# Patient Record
Sex: FEMALE | Race: WHITE | NOT HISPANIC OR LATINO | Employment: OTHER | URBAN - METROPOLITAN AREA
[De-identification: names, ages, dates, MRNs, and addresses within clinical notes are randomized per-mention and may not be internally consistent; named-entity substitution may affect disease eponyms.]

---

## 2021-08-26 ENCOUNTER — HOSPITAL ENCOUNTER (EMERGENCY)
Facility: MEDICAL CENTER | Age: 66
End: 2021-08-26
Payer: MEDICARE

## 2021-08-26 VITALS
WEIGHT: 122.8 LBS | BODY MASS INDEX: 24.76 KG/M2 | RESPIRATION RATE: 14 BRPM | SYSTOLIC BLOOD PRESSURE: 122 MMHG | HEART RATE: 79 BPM | HEIGHT: 59 IN | TEMPERATURE: 102.6 F | OXYGEN SATURATION: 95 % | DIASTOLIC BLOOD PRESSURE: 66 MMHG

## 2021-08-26 LAB
APPEARANCE UR: CLEAR
BACTERIA #/AREA URNS HPF: NEGATIVE /HPF
BILIRUB UR QL STRIP.AUTO: NEGATIVE
COLOR UR: YELLOW
EPI CELLS #/AREA URNS HPF: NEGATIVE /HPF
GLUCOSE UR STRIP.AUTO-MCNC: NEGATIVE MG/DL
HYALINE CASTS #/AREA URNS LPF: ABNORMAL /LPF
KETONES UR STRIP.AUTO-MCNC: ABNORMAL MG/DL
LEUKOCYTE ESTERASE UR QL STRIP.AUTO: NEGATIVE
MICRO URNS: ABNORMAL
NITRITE UR QL STRIP.AUTO: NEGATIVE
PH UR STRIP.AUTO: 5.5 [PH] (ref 5–8)
PROT UR QL STRIP: NEGATIVE MG/DL
RBC # URNS HPF: ABNORMAL /HPF
RBC UR QL AUTO: ABNORMAL
SP GR UR STRIP.AUTO: 1.02
UROBILINOGEN UR STRIP.AUTO-MCNC: 0.2 MG/DL
WBC #/AREA URNS HPF: ABNORMAL /HPF

## 2021-08-26 PROCEDURE — U0005 INFEC AGEN DETEC AMPLI PROBE: HCPCS

## 2021-08-26 PROCEDURE — 700102 HCHG RX REV CODE 250 W/ 637 OVERRIDE(OP): Performed by: EMERGENCY MEDICINE

## 2021-08-26 PROCEDURE — 302449 STATCHG TRIAGE ONLY (STATISTIC)

## 2021-08-26 PROCEDURE — A9270 NON-COVERED ITEM OR SERVICE: HCPCS | Performed by: EMERGENCY MEDICINE

## 2021-08-26 PROCEDURE — U0003 INFECTIOUS AGENT DETECTION BY NUCLEIC ACID (DNA OR RNA); SEVERE ACUTE RESPIRATORY SYNDROME CORONAVIRUS 2 (SARS-COV-2) (CORONAVIRUS DISEASE [COVID-19]), AMPLIFIED PROBE TECHNIQUE, MAKING USE OF HIGH THROUGHPUT TECHNOLOGIES AS DESCRIBED BY CMS-2020-01-R: HCPCS

## 2021-08-26 PROCEDURE — 81001 URINALYSIS AUTO W/SCOPE: CPT

## 2021-08-26 RX ORDER — ACETAMINOPHEN 325 MG/1
650 TABLET ORAL ONCE
Status: COMPLETED | OUTPATIENT
Start: 2021-08-26 | End: 2021-08-26

## 2021-08-26 RX ADMIN — ACETAMINOPHEN 650 MG: 325 TABLET, FILM COATED ORAL at 21:45

## 2021-08-27 LAB
SARS-COV-2 RNA RESP QL NAA+PROBE: DETECTED
SPECIMEN SOURCE: ABNORMAL

## 2021-08-27 NOTE — ED TRIAGE NOTES
"Patient vital signs rechecked and documented per [facility]. Patient denies any new needs at this time.  Patient updated on wait times, thanked for patience. Pt informed to alert triage tech or triage RN with any needs and/or changes in condition; patient verbalized understanding.   Patient stated \"I feel the same\"  "

## 2021-08-27 NOTE — ED NOTES
"Pt's family reentered ER waiting room, upset pt has not been seen. Family states, \"We're leaving.\" Refuse to sign AMA form. Will dismiss.   "

## 2021-08-27 NOTE — ED TRIAGE NOTES
"Chief Complaint   Patient presents with   • Coronavirus Screening     Pt started feeling bad today, reports sudden onset fever. Pt did not receive a covid vaccine. Pt states that people around her have tested positive for covid.    • Painful Urination     /70   Pulse 84   Temp (!) 38.9 °C (102 °F) (Oral)   Resp 16   Ht 1.499 m (4' 11\")   Wt 55.7 kg (122 lb 12.7 oz)   SpO2 94% Comment: RA  BMI 24.80 kg/m²     Pt is ambulatory in and out of triage with a steady gait. Pt has dementia at baseline, pt is accompanied by her niece, who she lives with. Appropriate PPE worn at times throughout encounter, had to ask pt and family multiple times to pull masks over noses. Pt placed back in the lobby and educated about triage process.    Pt placed in senior lounge, swabbed for covid, urine cup given in triage.   "

## 2021-10-25 ENCOUNTER — APPOINTMENT (OUTPATIENT)
Dept: RADIOLOGY | Facility: MEDICAL CENTER | Age: 66
DRG: 853 | End: 2021-10-25
Attending: EMERGENCY MEDICINE
Payer: MEDICARE

## 2021-10-25 ENCOUNTER — OFFICE VISIT (OUTPATIENT)
Dept: URGENT CARE | Facility: CLINIC | Age: 66
End: 2021-10-25

## 2021-10-25 ENCOUNTER — OFFICE VISIT (OUTPATIENT)
Dept: URGENT CARE | Facility: CLINIC | Age: 66
End: 2021-10-25
Payer: MEDICARE

## 2021-10-25 ENCOUNTER — HOSPITAL ENCOUNTER (INPATIENT)
Facility: MEDICAL CENTER | Age: 66
LOS: 7 days | DRG: 853 | End: 2021-11-01
Attending: EMERGENCY MEDICINE | Admitting: STUDENT IN AN ORGANIZED HEALTH CARE EDUCATION/TRAINING PROGRAM
Payer: MEDICARE

## 2021-10-25 VITALS
DIASTOLIC BLOOD PRESSURE: 60 MMHG | HEIGHT: 59 IN | TEMPERATURE: 100.7 F | WEIGHT: 126 LBS | BODY MASS INDEX: 25.4 KG/M2 | SYSTOLIC BLOOD PRESSURE: 108 MMHG | HEART RATE: 85 BPM | OXYGEN SATURATION: 95 % | RESPIRATION RATE: 16 BRPM

## 2021-10-25 VITALS
OXYGEN SATURATION: 95 % | BODY MASS INDEX: 25.4 KG/M2 | HEART RATE: 85 BPM | DIASTOLIC BLOOD PRESSURE: 60 MMHG | TEMPERATURE: 100.7 F | HEIGHT: 59 IN | RESPIRATION RATE: 16 BRPM | WEIGHT: 126 LBS | SYSTOLIC BLOOD PRESSURE: 108 MMHG

## 2021-10-25 DIAGNOSIS — R41.82 ALTERED MENTAL STATUS, UNSPECIFIED ALTERED MENTAL STATUS TYPE: ICD-10-CM

## 2021-10-25 DIAGNOSIS — R50.9 FEVER AND CHILLS: ICD-10-CM

## 2021-10-25 DIAGNOSIS — R19.7 DIARRHEA, UNSPECIFIED TYPE: ICD-10-CM

## 2021-10-25 DIAGNOSIS — R41.0 DISORIENTATION, UNSPECIFIED: ICD-10-CM

## 2021-10-25 DIAGNOSIS — R41.0 DISORIENTED: ICD-10-CM

## 2021-10-25 DIAGNOSIS — A41.9 SEPSIS, DUE TO UNSPECIFIED ORGANISM, UNSPECIFIED WHETHER ACUTE ORGAN DYSFUNCTION PRESENT (HCC): ICD-10-CM

## 2021-10-25 DIAGNOSIS — R50.9 FEVER, UNSPECIFIED FEVER CAUSE: ICD-10-CM

## 2021-10-25 LAB
ANION GAP SERPL CALC-SCNC: 14 MMOL/L (ref 7–16)
APPEARANCE UR: ABNORMAL
APPEARANCE UR: CLEAR
BACTERIA #/AREA URNS HPF: NEGATIVE /HPF
BASOPHILS # BLD AUTO: 0.3 % (ref 0–1.8)
BASOPHILS # BLD: 0.12 K/UL (ref 0–0.12)
BILIRUB UR QL STRIP.AUTO: ABNORMAL
BILIRUB UR STRIP-MCNC: NORMAL MG/DL
BUN SERPL-MCNC: 15 MG/DL (ref 8–22)
CALCIUM SERPL-MCNC: 9.2 MG/DL (ref 8.5–10.5)
CHLORIDE SERPL-SCNC: 99 MMOL/L (ref 96–112)
CO2 SERPL-SCNC: 24 MMOL/L (ref 20–33)
COLOR UR AUTO: NORMAL
COLOR UR: ABNORMAL
CREAT SERPL-MCNC: 1.18 MG/DL (ref 0.5–1.4)
EOSINOPHIL # BLD AUTO: 0.04 K/UL (ref 0–0.51)
EOSINOPHIL NFR BLD: 0.1 % (ref 0–6.9)
EPI CELLS #/AREA URNS HPF: ABNORMAL /HPF
ERYTHROCYTE [DISTWIDTH] IN BLOOD BY AUTOMATED COUNT: 45.9 FL (ref 35.9–50)
EXTERNAL QUALITY CONTROL: NORMAL
GLUCOSE SERPL-MCNC: 152 MG/DL (ref 65–99)
GLUCOSE UR STRIP.AUTO-MCNC: NEGATIVE MG/DL
GLUCOSE UR STRIP.AUTO-MCNC: NEGATIVE MG/DL
GRAN CASTS #/AREA URNS LPF: ABNORMAL /LPF
HCT VFR BLD AUTO: 40.9 % (ref 37–47)
HGB BLD-MCNC: 13.8 G/DL (ref 12–16)
HYALINE CASTS #/AREA URNS LPF: ABNORMAL /LPF
IMM GRANULOCYTES # BLD AUTO: 0.63 K/UL (ref 0–0.11)
IMM GRANULOCYTES NFR BLD AUTO: 1.6 % (ref 0–0.9)
KETONES UR STRIP.AUTO-MCNC: ABNORMAL MG/DL
KETONES UR STRIP.AUTO-MCNC: NORMAL MG/DL
LACTATE BLD-SCNC: 3.9 MMOL/L (ref 0.5–2)
LEUKOCYTE ESTERASE UR QL STRIP.AUTO: NEGATIVE
LEUKOCYTE ESTERASE UR QL STRIP.AUTO: NORMAL
LYMPHOCYTES # BLD AUTO: 0.65 K/UL (ref 1–4.8)
LYMPHOCYTES NFR BLD: 1.7 % (ref 22–41)
MCH RBC QN AUTO: 30.7 PG (ref 27–33)
MCHC RBC AUTO-ENTMCNC: 33.7 G/DL (ref 33.6–35)
MCV RBC AUTO: 90.9 FL (ref 81.4–97.8)
MICRO URNS: ABNORMAL
MONOCYTES # BLD AUTO: 0.97 K/UL (ref 0–0.85)
MONOCYTES NFR BLD AUTO: 2.5 % (ref 0–13.4)
MUCOUS THREADS #/AREA URNS HPF: ABNORMAL /HPF
NEUTROPHILS # BLD AUTO: 35.81 K/UL (ref 2–7.15)
NEUTROPHILS NFR BLD: 93.8 % (ref 44–72)
NITRITE UR QL STRIP.AUTO: NEGATIVE
NITRITE UR QL STRIP.AUTO: NEGATIVE
NRBC # BLD AUTO: 0 K/UL
NRBC BLD-RTO: 0 /100 WBC
PH UR STRIP.AUTO: 5 [PH] (ref 5–8)
PH UR STRIP.AUTO: 5.5 [PH] (ref 5–8)
PLATELET # BLD AUTO: 299 K/UL (ref 164–446)
PMV BLD AUTO: 9.2 FL (ref 9–12.9)
POTASSIUM SERPL-SCNC: 3.5 MMOL/L (ref 3.6–5.5)
PROT UR QL STRIP: 100 MG/DL
PROT UR QL STRIP: NORMAL MG/DL
RBC # BLD AUTO: 4.5 M/UL (ref 4.2–5.4)
RBC # URNS HPF: ABNORMAL /HPF
RBC UR QL AUTO: ABNORMAL
RBC UR QL AUTO: NORMAL
SARS-COV+SARS-COV-2 AG RESP QL IA.RAPID: NEGATIVE
SODIUM SERPL-SCNC: 137 MMOL/L (ref 135–145)
SP GR UR STRIP.AUTO: 1.03
SP GR UR STRIP.AUTO: >=1.03
UROBILINOGEN UR STRIP-MCNC: NORMAL MG/DL
UROBILINOGEN UR STRIP.AUTO-MCNC: 0.2 MG/DL
WBC # BLD AUTO: 38.2 K/UL (ref 4.8–10.8)
WBC #/AREA URNS HPF: ABNORMAL /HPF

## 2021-10-25 PROCEDURE — 85025 COMPLETE CBC W/AUTO DIFF WBC: CPT

## 2021-10-25 PROCEDURE — 99285 EMERGENCY DEPT VISIT HI MDM: CPT

## 2021-10-25 PROCEDURE — 87426 SARSCOV CORONAVIRUS AG IA: CPT | Performed by: NURSE PRACTITIONER

## 2021-10-25 PROCEDURE — 71045 X-RAY EXAM CHEST 1 VIEW: CPT

## 2021-10-25 PROCEDURE — 770020 HCHG ROOM/CARE - TELE (206)

## 2021-10-25 PROCEDURE — 87040 BLOOD CULTURE FOR BACTERIA: CPT | Mod: 91

## 2021-10-25 PROCEDURE — 84145 PROCALCITONIN (PCT): CPT

## 2021-10-25 PROCEDURE — 87181 SC STD AGAR DILUTION PER AGT: CPT

## 2021-10-25 PROCEDURE — 700105 HCHG RX REV CODE 258: Performed by: EMERGENCY MEDICINE

## 2021-10-25 PROCEDURE — 87077 CULTURE AEROBIC IDENTIFY: CPT

## 2021-10-25 PROCEDURE — 85610 PROTHROMBIN TIME: CPT

## 2021-10-25 PROCEDURE — 83735 ASSAY OF MAGNESIUM: CPT

## 2021-10-25 PROCEDURE — 36415 COLL VENOUS BLD VENIPUNCTURE: CPT

## 2021-10-25 PROCEDURE — 700111 HCHG RX REV CODE 636 W/ 250 OVERRIDE (IP): Performed by: EMERGENCY MEDICINE

## 2021-10-25 PROCEDURE — 83605 ASSAY OF LACTIC ACID: CPT

## 2021-10-25 PROCEDURE — 99223 1ST HOSP IP/OBS HIGH 75: CPT | Performed by: STUDENT IN AN ORGANIZED HEALTH CARE EDUCATION/TRAINING PROGRAM

## 2021-10-25 PROCEDURE — 99204 OFFICE O/P NEW MOD 45 MIN: CPT | Mod: CS | Performed by: NURSE PRACTITIONER

## 2021-10-25 PROCEDURE — 96374 THER/PROPH/DIAG INJ IV PUSH: CPT

## 2021-10-25 PROCEDURE — 84443 ASSAY THYROID STIM HORMONE: CPT

## 2021-10-25 PROCEDURE — 80048 BASIC METABOLIC PNL TOTAL CA: CPT

## 2021-10-25 PROCEDURE — 81001 URINALYSIS AUTO W/SCOPE: CPT

## 2021-10-25 PROCEDURE — 81002 URINALYSIS NONAUTO W/O SCOPE: CPT | Performed by: NURSE PRACTITIONER

## 2021-10-25 PROCEDURE — 70450 CT HEAD/BRAIN W/O DYE: CPT

## 2021-10-25 RX ORDER — GUAIFENESIN/DEXTROMETHORPHAN 100-10MG/5
10 SYRUP ORAL EVERY 6 HOURS PRN
Status: DISCONTINUED | OUTPATIENT
Start: 2021-10-25 | End: 2021-11-01 | Stop reason: HOSPADM

## 2021-10-25 RX ORDER — CEFTRIAXONE 2 G/1
2 INJECTION, POWDER, FOR SOLUTION INTRAMUSCULAR; INTRAVENOUS ONCE
Status: COMPLETED | OUTPATIENT
Start: 2021-10-25 | End: 2021-10-25

## 2021-10-25 RX ORDER — BISACODYL 10 MG
10 SUPPOSITORY, RECTAL RECTAL
Status: DISCONTINUED | OUTPATIENT
Start: 2021-10-25 | End: 2021-10-27

## 2021-10-25 RX ORDER — ACETAMINOPHEN 325 MG/1
650 TABLET ORAL EVERY 6 HOURS PRN
Status: DISCONTINUED | OUTPATIENT
Start: 2021-10-25 | End: 2021-11-01 | Stop reason: HOSPADM

## 2021-10-25 RX ORDER — ONDANSETRON 2 MG/ML
4 INJECTION INTRAMUSCULAR; INTRAVENOUS EVERY 4 HOURS PRN
Status: DISCONTINUED | OUTPATIENT
Start: 2021-10-25 | End: 2021-11-01 | Stop reason: HOSPADM

## 2021-10-25 RX ORDER — ENALAPRILAT 1.25 MG/ML
1.25 INJECTION INTRAVENOUS EVERY 6 HOURS PRN
Status: DISCONTINUED | OUTPATIENT
Start: 2021-10-25 | End: 2021-11-01 | Stop reason: HOSPADM

## 2021-10-25 RX ORDER — SODIUM CHLORIDE 9 MG/ML
1000 INJECTION, SOLUTION INTRAVENOUS ONCE
Status: COMPLETED | OUTPATIENT
Start: 2021-10-25 | End: 2021-10-26

## 2021-10-25 RX ORDER — LABETALOL HYDROCHLORIDE 5 MG/ML
10 INJECTION, SOLUTION INTRAVENOUS EVERY 4 HOURS PRN
Status: DISCONTINUED | OUTPATIENT
Start: 2021-10-25 | End: 2021-11-01 | Stop reason: HOSPADM

## 2021-10-25 RX ORDER — SODIUM CHLORIDE, SODIUM LACTATE, POTASSIUM CHLORIDE, CALCIUM CHLORIDE 600; 310; 30; 20 MG/100ML; MG/100ML; MG/100ML; MG/100ML
INJECTION, SOLUTION INTRAVENOUS CONTINUOUS
Status: DISCONTINUED | OUTPATIENT
Start: 2021-10-26 | End: 2021-11-01

## 2021-10-25 RX ORDER — POLYETHYLENE GLYCOL 3350 17 G/17G
1 POWDER, FOR SOLUTION ORAL
Status: DISCONTINUED | OUTPATIENT
Start: 2021-10-25 | End: 2021-10-27

## 2021-10-25 RX ORDER — ONDANSETRON 4 MG/1
4 TABLET, ORALLY DISINTEGRATING ORAL EVERY 4 HOURS PRN
Status: DISCONTINUED | OUTPATIENT
Start: 2021-10-25 | End: 2021-11-01 | Stop reason: HOSPADM

## 2021-10-25 RX ORDER — AMOXICILLIN 250 MG
2 CAPSULE ORAL 2 TIMES DAILY
Status: DISCONTINUED | OUTPATIENT
Start: 2021-10-26 | End: 2021-10-27

## 2021-10-25 RX ADMIN — SODIUM CHLORIDE 1000 ML: 9 INJECTION, SOLUTION INTRAVENOUS at 23:38

## 2021-10-25 RX ADMIN — CEFTRIAXONE SODIUM 2 G: 2 INJECTION, POWDER, FOR SOLUTION INTRAMUSCULAR; INTRAVENOUS at 23:38

## 2021-10-25 ASSESSMENT — ENCOUNTER SYMPTOMS
FEVER: 1
NAUSEA: 1
DIARRHEA: 1
VOMITING: 1
SENSORY CHANGE: 1
CHILLS: 1

## 2021-10-26 PROBLEM — R73.9 HYPERGLYCEMIA: Status: ACTIVE | Noted: 2021-10-26

## 2021-10-26 PROBLEM — A41.9 SEPSIS (HCC): Status: ACTIVE | Noted: 2021-10-26

## 2021-10-26 PROBLEM — R11.2 N&V (NAUSEA AND VOMITING): Status: ACTIVE | Noted: 2021-10-26

## 2021-10-26 PROBLEM — R79.89 ELEVATED LACTIC ACID LEVEL: Status: ACTIVE | Noted: 2021-10-26

## 2021-10-26 LAB
ALBUMIN SERPL BCP-MCNC: 3.6 G/DL (ref 3.2–4.9)
ALBUMIN/GLOB SERPL: 1.4 G/DL
ALP SERPL-CCNC: 93 U/L (ref 30–99)
ALT SERPL-CCNC: 28 U/L (ref 2–50)
ANION GAP SERPL CALC-SCNC: 12 MMOL/L (ref 7–16)
AST SERPL-CCNC: 32 U/L (ref 12–45)
BASOPHILS # BLD AUTO: 0.3 % (ref 0–1.8)
BASOPHILS # BLD: 0.1 K/UL (ref 0–0.12)
BILIRUB SERPL-MCNC: 0.5 MG/DL (ref 0.1–1.5)
BUN SERPL-MCNC: 16 MG/DL (ref 8–22)
CALCIUM SERPL-MCNC: 8.4 MG/DL (ref 8.5–10.5)
CHLORIDE SERPL-SCNC: 101 MMOL/L (ref 96–112)
CHOLEST SERPL-MCNC: 141 MG/DL (ref 100–199)
CO2 SERPL-SCNC: 23 MMOL/L (ref 20–33)
CREAT SERPL-MCNC: 1.01 MG/DL (ref 0.5–1.4)
CRP SERPL HS-MCNC: 15.44 MG/DL (ref 0–0.75)
EKG IMPRESSION: NORMAL
EOSINOPHIL # BLD AUTO: 0 K/UL (ref 0–0.51)
EOSINOPHIL NFR BLD: 0 % (ref 0–6.9)
ERYTHROCYTE [DISTWIDTH] IN BLOOD BY AUTOMATED COUNT: 47.1 FL (ref 35.9–50)
ERYTHROCYTE [SEDIMENTATION RATE] IN BLOOD BY WESTERGREN METHOD: 40 MM/HOUR (ref 0–25)
EST. AVERAGE GLUCOSE BLD GHB EST-MCNC: 105 MG/DL
GLOBULIN SER CALC-MCNC: 2.6 G/DL (ref 1.9–3.5)
GLUCOSE SERPL-MCNC: 121 MG/DL (ref 65–99)
HBA1C MFR BLD: 5.3 % (ref 4–5.6)
HCT VFR BLD AUTO: 36.9 % (ref 37–47)
HDLC SERPL-MCNC: 43 MG/DL
HGB BLD-MCNC: 12.5 G/DL (ref 12–16)
IMM GRANULOCYTES # BLD AUTO: 0.5 K/UL (ref 0–0.11)
IMM GRANULOCYTES NFR BLD AUTO: 1.5 % (ref 0–0.9)
INR PPP: 1.17 (ref 0.87–1.13)
LACTATE BLD-SCNC: 1.6 MMOL/L (ref 0.5–2)
LACTATE BLD-SCNC: 1.9 MMOL/L (ref 0.5–2)
LACTATE BLD-SCNC: 2.7 MMOL/L (ref 0.5–2)
LDLC SERPL CALC-MCNC: 84 MG/DL
LYMPHOCYTES # BLD AUTO: 0.8 K/UL (ref 1–4.8)
LYMPHOCYTES NFR BLD: 2.4 % (ref 22–41)
MAGNESIUM SERPL-MCNC: 1.6 MG/DL (ref 1.5–2.5)
MCH RBC QN AUTO: 31.1 PG (ref 27–33)
MCHC RBC AUTO-ENTMCNC: 33.9 G/DL (ref 33.6–35)
MCV RBC AUTO: 91.8 FL (ref 81.4–97.8)
MONOCYTES # BLD AUTO: 0.88 K/UL (ref 0–0.85)
MONOCYTES NFR BLD AUTO: 2.6 % (ref 0–13.4)
NEUTROPHILS # BLD AUTO: 31.76 K/UL (ref 2–7.15)
NEUTROPHILS NFR BLD: 93.2 % (ref 44–72)
NRBC # BLD AUTO: 0 K/UL
NRBC BLD-RTO: 0 /100 WBC
NT-PROBNP SERPL IA-MCNC: 1617 PG/ML (ref 0–125)
PLATELET # BLD AUTO: 276 K/UL (ref 164–446)
PMV BLD AUTO: 9.2 FL (ref 9–12.9)
POTASSIUM SERPL-SCNC: 3.8 MMOL/L (ref 3.6–5.5)
PROCALCITONIN SERPL-MCNC: 29.21 NG/ML
PROCALCITONIN SERPL-MCNC: 30.73 NG/ML
PROT SERPL-MCNC: 6.2 G/DL (ref 6–8.2)
PROTHROMBIN TIME: 14.6 SEC (ref 12–14.6)
RBC # BLD AUTO: 4.02 M/UL (ref 4.2–5.4)
SODIUM SERPL-SCNC: 136 MMOL/L (ref 135–145)
TRIGL SERPL-MCNC: 71 MG/DL (ref 0–149)
TSH SERPL DL<=0.005 MIU/L-ACNC: 0.82 UIU/ML (ref 0.38–5.33)
WBC # BLD AUTO: 34 K/UL (ref 4.8–10.8)

## 2021-10-26 PROCEDURE — 99233 SBSQ HOSP IP/OBS HIGH 50: CPT | Mod: GC | Performed by: INTERNAL MEDICINE

## 2021-10-26 PROCEDURE — 85652 RBC SED RATE AUTOMATED: CPT

## 2021-10-26 PROCEDURE — 93005 ELECTROCARDIOGRAM TRACING: CPT

## 2021-10-26 PROCEDURE — 85025 COMPLETE CBC W/AUTO DIFF WBC: CPT

## 2021-10-26 PROCEDURE — 83605 ASSAY OF LACTIC ACID: CPT

## 2021-10-26 PROCEDURE — 80053 COMPREHEN METABOLIC PANEL: CPT

## 2021-10-26 PROCEDURE — 700105 HCHG RX REV CODE 258: Performed by: STUDENT IN AN ORGANIZED HEALTH CARE EDUCATION/TRAINING PROGRAM

## 2021-10-26 PROCEDURE — 36415 COLL VENOUS BLD VENIPUNCTURE: CPT

## 2021-10-26 PROCEDURE — 93010 ELECTROCARDIOGRAM REPORT: CPT | Performed by: INTERNAL MEDICINE

## 2021-10-26 PROCEDURE — 84145 PROCALCITONIN (PCT): CPT

## 2021-10-26 PROCEDURE — 700102 HCHG RX REV CODE 250 W/ 637 OVERRIDE(OP): Performed by: STUDENT IN AN ORGANIZED HEALTH CARE EDUCATION/TRAINING PROGRAM

## 2021-10-26 PROCEDURE — 700111 HCHG RX REV CODE 636 W/ 250 OVERRIDE (IP)

## 2021-10-26 PROCEDURE — A9270 NON-COVERED ITEM OR SERVICE: HCPCS | Performed by: STUDENT IN AN ORGANIZED HEALTH CARE EDUCATION/TRAINING PROGRAM

## 2021-10-26 PROCEDURE — 80061 LIPID PANEL: CPT

## 2021-10-26 PROCEDURE — 700111 HCHG RX REV CODE 636 W/ 250 OVERRIDE (IP): Performed by: STUDENT IN AN ORGANIZED HEALTH CARE EDUCATION/TRAINING PROGRAM

## 2021-10-26 PROCEDURE — 770020 HCHG ROOM/CARE - TELE (206)

## 2021-10-26 PROCEDURE — 83880 ASSAY OF NATRIURETIC PEPTIDE: CPT

## 2021-10-26 PROCEDURE — 83036 HEMOGLOBIN GLYCOSYLATED A1C: CPT

## 2021-10-26 PROCEDURE — 96372 THER/PROPH/DIAG INJ SC/IM: CPT

## 2021-10-26 PROCEDURE — 700105 HCHG RX REV CODE 258

## 2021-10-26 PROCEDURE — 86140 C-REACTIVE PROTEIN: CPT

## 2021-10-26 RX ORDER — METRONIDAZOLE 500 MG/1
500 TABLET ORAL EVERY 8 HOURS
Status: DISCONTINUED | OUTPATIENT
Start: 2021-10-26 | End: 2021-10-26

## 2021-10-26 RX ADMIN — PIPERACILLIN AND TAZOBACTAM 3.38 G: 3; .375 INJECTION, POWDER, LYOPHILIZED, FOR SOLUTION INTRAVENOUS; PARENTERAL at 23:17

## 2021-10-26 RX ADMIN — ACETAMINOPHEN 650 MG: 325 TABLET, FILM COATED ORAL at 10:44

## 2021-10-26 RX ADMIN — ACETAMINOPHEN 650 MG: 325 TABLET, FILM COATED ORAL at 23:27

## 2021-10-26 RX ADMIN — CEFTRIAXONE SODIUM 2 G: 2 INJECTION, POWDER, FOR SOLUTION INTRAMUSCULAR; INTRAVENOUS at 17:27

## 2021-10-26 RX ADMIN — SODIUM CHLORIDE, POTASSIUM CHLORIDE, SODIUM LACTATE AND CALCIUM CHLORIDE: 600; 310; 30; 20 INJECTION, SOLUTION INTRAVENOUS at 20:57

## 2021-10-26 RX ADMIN — SODIUM CHLORIDE, POTASSIUM CHLORIDE, SODIUM LACTATE AND CALCIUM CHLORIDE: 600; 310; 30; 20 INJECTION, SOLUTION INTRAVENOUS at 00:14

## 2021-10-26 RX ADMIN — PIPERACILLIN AND TAZOBACTAM 4.5 G: 4; .5 INJECTION, POWDER, LYOPHILIZED, FOR SOLUTION INTRAVENOUS; PARENTERAL at 20:24

## 2021-10-26 RX ADMIN — ENOXAPARIN SODIUM 40 MG: 40 INJECTION SUBCUTANEOUS at 06:50

## 2021-10-26 RX ADMIN — ACETAMINOPHEN 650 MG: 325 TABLET, FILM COATED ORAL at 17:27

## 2021-10-26 ASSESSMENT — ENCOUNTER SYMPTOMS
DOUBLE VISION: 0
NECK PAIN: 0
BLURRED VISION: 0
MYALGIAS: 0
DEPRESSION: 0
FEVER: 0
DIZZINESS: 0
NAUSEA: 0
CHILLS: 0
COUGH: 0
HEMOPTYSIS: 0
PALPITATIONS: 0
HEADACHES: 0
HEARTBURN: 0

## 2021-10-26 ASSESSMENT — FIBROSIS 4 INDEX
FIB4 SCORE: 1.49
FIB4 SCORE: 1.49

## 2021-10-26 ASSESSMENT — PAIN DESCRIPTION - PAIN TYPE: TYPE: ACUTE PAIN

## 2021-10-26 NOTE — DISCHARGE PLANNING
SW has left message with phone number [phone]  No answer. Message states phone number belongs to [employer] and that voice mail box is full and a message can not be left.     JEREMY attempted 2 times calling that number- no answer.

## 2021-10-26 NOTE — ED NOTES
"PD at bedside  PD reports that Pt told them that \"family was going to be flying away and leaving me.\" PD going to speak to  to discuss situation. No current address or confirmed correct phone number listed for PT.  "

## 2021-10-26 NOTE — ED TRIAGE NOTES
"Chief Complaint   Patient presents with   • ALOC     BIB EMS from  for ALOC. Pt AOx2, alert to person and situation. Pt states \"I'm sick with dementia.\"     Pt has Hx of dementia. Family was supposed to show up to [facility] but never arrived and contact could not be made with family.  "

## 2021-10-26 NOTE — PROGRESS NOTES
Patient admitted into room 801 from [facility] in castillo bed. She is alert and oriented x3. Possibly confused to situation vs family story.   Skin intact.

## 2021-10-26 NOTE — ED NOTES
[facility] states that Pt was picked up from  [address] [place] [address] [place] [address]    [facility] states that 911 caller  Was possibly Pt's niece at  [phone]     notified

## 2021-10-26 NOTE — H&P
"Hospital Medicine History & Physical Note    Date of Service  10/26/2021    Primary Care Physician  Pcp Pt States None    Consultants      Code Status  Full Code    Chief Complaint  Chief Complaint   Patient presents with   • ALOC     BIB EMS from  for ALOC. Pt AOx2, alert to person and situation. Pt states \"I'm sick with dementia.\"       History of Presenting Illness  Dottie Lara is a 66 y.o. female who presented 10/25/2021 per report, with altered levels of consciousness secondary to possible dementia diagnosis. There is no one at bedside to confirm or deny baseline mental status. She was transferred here via EMS from urgent care. She arrived with a bruise to the upper right cheek, stating she bumped into a door 2 days ago. She noted that her \"aunt brought her in because she is crazy\". She denies any abdominal pain.    Patient's family informed RN that she has been experiencing nausea, diarrhea, and emesis all throughout the day.     Unable to obtain further history, patient's family unreachable at time of admission.      Notable findings include: RR 43, HR 98, LA 3.9, WBC 38.2, moderate occult blood at UA, GFR 46, K 3.5, glucose 152    Chest xray showing: No acute cardiopulmonary disease. 2. Round calcification in the right upper quadrant, appearance suggests calcified gallstone. 3. Atherosclerosis     Ct head showing: No acute intracranial abnormality is identified, there are nonspecific white matter changes, commonly associated with small vessel ischemic disease.  Associated mild cerebral atrophy is noted.  2.  Right maxillary sinus mucus retention cyst  3.  Atherosclerosis.    Patient is AAAX1 to self only when seen for PE, she states that she lives here, and is unaware of where she is.     I discussed the plan of care with patient.    Review of Systems  Review of Systems   Constitutional: Positive for malaise/fatigue.   Gastrointestinal: Positive for diarrhea, nausea and vomiting.   All other systems " reviewed and are negative.      Past Medical History  Unable to assess due to AMS    Surgical History  Unable to assess due to AMS    Family History  Unable to assess due to AMS  Family history reviewed with patient. There is no family history that is pertinent to the chief complaint.     Social History   reports that she has quit smoking. She has never used smokeless tobacco. She reports previous alcohol use. She reports previous drug use. unable to assess due to AMS    Allergies  No Known Allergies    Medications  None       Physical Exam  Temp:  [37.4 °C (99.4 °F)-38.2 °C (100.7 °F)] 38.2 °C (100.7 °F)  Pulse:  [82-98] 82  Resp:  [16-43] 21  BP: (108-137)/(59-62) 127/61  SpO2:  [91 %-95 %] 92 %  Blood Pressure : 127/59   Temperature: 37.4 °C (99.4 °F)   Pulse: 90   Respiration: (!) 22   Pulse Oximetry: 91 %       Physical Exam     Constitutional: Resting comfortably in NAD   HENT: Normocephalic, no obvious evidence of acute trauma.  Eyes: No scleral icterus. Normal conjunctiva   Neck: Comfortable movement without any obvious restriction in the range of motion.  Cardiovascular: Upon ascultation I appreciate a regular heart rhythm and a normal rate with no murmurs, rubs or gallops  Thorax & Lungs: No respiratory distress. No wheezing, rales or rhonchi heard on ausculation.  there is no obvious chest wall tenderness. I appreciate normal air movement throughout.   Abdomen: The abdomen is not visibly distended. Upon palpation, I find it to be without tenderness.  No mass appreciated.  Skin: ecchymosis at right upper cheek  Extremities/Musculoskeletal: no lower extremity edema with no asymmetry.  Neurologic: AAAX1 to self only  Psychiatric: unable to assess due to AMS    Laboratory:  Recent Labs     10/25/21  2105   WBC 38.2*   RBC 4.50   HEMOGLOBIN 13.8   HEMATOCRIT 40.9   MCV 90.9   MCH 30.7   MCHC 33.7   RDW 45.9   PLATELETCT 299   MPV 9.2     Recent Labs     10/25/21  2105   SODIUM 137   POTASSIUM 3.5*   CHLORIDE  99   CO2 24   GLUCOSE 152*   BUN 15   CREATININE 1.18   CALCIUM 9.2     Recent Labs     10/25/21  2105   GLUCOSE 152*     Recent Labs     10/25/21  2105   INR 1.17*     No results for input(s): NTPROBNP in the last 72 hours.      No results for input(s): TROPONINT in the last 72 hours.    Imaging:  CT-HEAD W/O   Final Result         1.  No acute intracranial abnormality is identified, there are nonspecific white matter changes, commonly associated with small vessel ischemic disease.  Associated mild cerebral atrophy is noted.   2.  Right maxillary sinus mucus retention cyst   3.  Atherosclerosis.      DX-CHEST-PORTABLE (1 VIEW)   Final Result         1.  No acute cardiopulmonary disease.   2.  Round calcification in the right upper quadrant, appearance suggests calcified gallstone.   3.  Atherosclerosis            Assessment/Plan:  I anticipate this patient will require at least two midnights for appropriate medical management, necessitating inpatient admission.    Sepsis (HCC)- (present on admission)  Assessment & Plan  This is Sepsis Present on admission  SIRS criteria identified on my evaluation include: Leukocytosis, with WBC greater than 12,000  Source is unk  Sepsis protocol initiated  Fluid resuscitation ordered per protocol  IV antibiotics as appropriate for source of sepsis  While organ dysfunction may be noted elsewhere in this problem list or in the chart, degree of organ dysfunction does not meet CMS criteria for severe sepsis          N&V (nausea and vomiting)- (present on admission)  Assessment & Plan  Antiemetics prn     Elevated lactic acid level- (present on admission)  Assessment & Plan  LA 3.9 on admission  Continue to monitor  IVF and ABX in place    Hyperglycemia- (present on admission)  Assessment & Plan  Glucose 152 on admission  a1C ordered to assess  Needs med rec after reaching family      VTE prophylaxis: SCDs/TEDs

## 2021-10-26 NOTE — ASSESSMENT & PLAN NOTE
This is Sepsis Present on admission  SIRS criteria identified on my evaluation include: Leukocytosis, with WBC greater than 12,000  Source is unk  Sepsis protocol initiated  Fluid resuscitation ordered per protocol  IV antibiotics as appropriate for source of sepsis  While organ dysfunction may be noted elsewhere in this problem list or in the chart, degree of organ dysfunction does not meet CMS criteria for severe sepsis

## 2021-10-26 NOTE — ED NOTES
"ERP at bedside  Pt stating that her aunt brought her to the UC \"because she thinks I'm crazy\".  "

## 2021-10-26 NOTE — PROGRESS NOTES
Subjective     Dottie Lara is a 66 y.o. female who presents with No chief complaint on file.            HPI    ROS           Objective     There were no vitals taken for this visit.     Physical Exam                        Assessment & Plan        There are no diagnoses linked to this encounter.

## 2021-10-26 NOTE — PROGRESS NOTES
Subjective     Dottie Lara is a 66 y.o. female who presents with Other (Pt states stomach pains, and patient has expressed not knowing where she is at. )    No past medical history on file.  Social History     Socioeconomic History   • Marital status: Not on file     Spouse name: Not on file   • Number of children: Not on file   • Years of education: Not on file   • Highest education level: Not on file   Occupational History   • Not on file   Tobacco Use   • Smoking status: Former Smoker   • Smokeless tobacco: Never Used   Vaping Use   • Vaping Use: Never used   Substance and Sexual Activity   • Alcohol use: Not Currently   • Drug use: Not Currently   • Sexual activity: Not on file   Other Topics Concern   • Not on file   Social History Narrative   • Not on file     Social Determinants of Health     Financial Resource Strain:    • Difficulty of Paying Living Expenses:    Food Insecurity:    • Worried About Running Out of Food in the Last Year:    • Ran Out of Food in the Last Year:    Transportation Needs:    • Lack of Transportation (Medical):    • Lack of Transportation (Non-Medical):    Physical Activity:    • Days of Exercise per Week:    • Minutes of Exercise per Session:    Stress:    • Feeling of Stress :    Social Connections:    • Frequency of Communication with Friends and Family:    • Frequency of Social Gatherings with Friends and Family:    • Attends [employer] Services:    • Active Member of Clubs or Organizations:    • Attends Club or Organization Meetings:    • Marital Status:    Intimate Partner Violence:    • Fear of Current or Ex-Partner:    • Emotionally Abused:    • Physically Abused:    • Sexually Abused:      No family history on file.    Allergies: Patient has no known allergies.    Patient is a 66-year-old female who is brought in possibly by [employer]; patient is uncertain as to how she got here..  She is by herself.  She is disoriented and states she does not know where she is.  She  "states she does not know where she lives.  She denies any symptoms at this time.  States no painful urination, no abdominal pain, no cough no shortness of breath.  I have rechecked her temperature p.o. and her oral temperature is 102.4.    She arrives at the exam room for urgent care without shoes on and very disheveled.    No past medical history on file.          Other  This is a new problem. The problem occurs constantly. Associated symptoms include chills and a fever. Nothing aggravates the symptoms. She has tried nothing for the symptoms. The treatment provided no relief.       Review of Systems   Constitutional: Positive for chills and fever.   Neurological: Positive for sensory change.        Confusion and disorientation              Objective     /60   Pulse 85   Temp (!) 38.2 °C (100.7 °F)   Resp 16   Ht 1.499 m (4' 11\")   Wt 57.2 kg (126 lb)   SpO2 95%   BMI 25.45 kg/m²      Physical Exam  Vitals reviewed.   HENT:      Mouth/Throat:      Mouth: Mucous membranes are moist.   Eyes:      Extraocular Movements: Extraocular movements intact.      Conjunctiva/sclera: Conjunctivae normal.      Pupils: Pupils are equal, round, and reactive to light.   Cardiovascular:      Rate and Rhythm: Normal rate and regular rhythm.      Heart sounds: Normal heart sounds.   Pulmonary:      Effort: Pulmonary effort is normal. No respiratory distress.      Breath sounds: Normal breath sounds. No stridor. No wheezing, rhonchi or rales.   Chest:      Chest wall: No tenderness.   Abdominal:      General: Abdomen is flat.      Tenderness: There is no abdominal tenderness. There is no right CVA tenderness, left CVA tenderness, guarding or rebound.   Musculoskeletal:      Cervical back: Normal range of motion and neck supple.   Skin:     General: Skin is warm and dry.   Neurological:      Mental Status: She is alert. She is disoriented.       Oral temperature is 102.4        UA: small leukocytes, negative nitrates, trace " blood     Point-of-care Covid: negative       Due to patient's significant fever and disorientation, I have determined that she needs a higher level of care than I can reasonably and safely provide here in the urgent care. DANNIELLE was contacted to transfer patient to [facility] for further evaluation at this time.                 Assessment & Plan   Fever  Disorientation    See note above  Patient transported to [facility] for further evaluation       There are no diagnoses linked to this encounter.

## 2021-10-26 NOTE — NON-PROVIDER
Daily Progress Note:     Date of Service: 10/26/2021  Primary Team: anthony   Attending: Kevin Woods M.D.   Senior Resident: Dr. Anu Huynh  Intern: Dr. Evaristo Dc  Contact:  [phone]    Chief Complaint:   Altered Level of Consciousness     Subjective:   Dottie Enamorado is a 66 y.o. female who presented to the ED on 10/25 with altered mental status. She was transfered by EMS from urgent care where she presented disoriented and with stomach pain. Denied abdominal pain, cough, SOB, dysuria, oral temp 102.4. She was transferred for higher level of care due to disorientation (Oriented to person and situation), and fever, and was admitted for ALOC.     Today, Dottie Enamorado is alert to person, not to place (believes is at Aunt’s house), time, situation. She reports she is “doing okay”. She denies abdominal pain, but reports previous surgery in the LLQ (unable to specify). She denies, headache, fever, chills, nausea, vomiting, diarrhea, cough, SOB, chest pain, abdominal pain, rash. She was able to ambulate without difficulty in gait.     Consultants/Specialty:  None    Objective Data:   Physical Exam:   Vitals:   Temp:  [37.4 °C (99.4 °F)-38.4 °C (101.1 °F)] 38.3 °C (101 °F)  Pulse:  [68-98] 89  Resp:  [16-43] 16  BP: ()/(50-92) 100/52  SpO2:  [90 %-99 %] 96 %     Neuropsych: alert, oriented to person. Not oriented to place, time, situation. No acute distress. Cranial nerves II-XII intact. Intact motor and sensory.   HEENT: bruising under left eye. Head normocephalic, atrumatic without tenderness, visible or palpable masses, depressions, or scarring.   Eyes: sclera is non-icteric, conjunctivae without exudates or hemorrhage.   Ears: external ear and ear canal are non-tender and withoutswelling. Canal clear without discharge. Hearing intact.   Nose: nares are patent bilaterally. No rhinnorhea.   Throat: oral muscoa pink and moist, with good dentition. Tongue normal in appearance without lesions and  with good symmetrical movement. No buccal nodules or lesions. Pharynx is normal in apparent without tonsillar swelling or exudates. No dental abscess.   Neck: Neck is supple without adenopathy. No JVD.  MSK: No swelling, eythema. No rash. Warm well perfused. Pulses palpable. No joint swelling. Full range of motion in all 4 extremities.   Cardiac: RRR. No m/g/r. Capillary refill <2.   Respiratory:  CTAB. No accessory muscle use.   Abdominal: soft, nontender to palpation. No visible lesions or scars. No guarding/rebound tenderness. Bowel sounds are present and normoactive. No masses, hepatomegaly or splenomegaly.     Labs:   Results for HAJA ANNE (MRN [id number]) as of 10/26/2021 13:44   Ref. Range 10/25/2021 21:05 10/26/2021 00:15   WBC Latest Ref Range: 4.8 - 10.8 K/uL 38.2 (HH) 34.0 (HH)   RBC Latest Ref Range: 4.20 - 5.40 M/uL 4.50 4.02 (L)   Hemoglobin Latest Ref Range: 12.0 - 16.0 g/dL 13.8 12.5   Hematocrit Latest Ref Range: 37.0 - 47.0 % 40.9 36.9 (L)   MCV Latest Ref Range: 81.4 - 97.8 fL 90.9 91.8   MCH Latest Ref Range: 27.0 - 33.0 pg 30.7 31.1   MCHC Latest Ref Range: 33.6 - 35.0 g/dL 33.7 33.9   RDW Latest Ref Range: 35.9 - 50.0 fL 45.9 47.1   Platelet Count Latest Ref Range: 164 - 446 K/uL 299 276   MPV Latest Ref Range: 9.0 - 12.9 fL 9.2 9.2   Results for HAJA ANNE (MRN [id number]) as of 10/26/2021 13:44   Ref. Range 10/25/2021 21:05 10/25/2021 22:54 10/26/2021 00:15 10/26/2021 03:56 10/26/2021 10:47   Sodium Latest Ref Range: 135 - 145 mmol/L 137  136     Potassium Latest Ref Range: 3.6 - 5.5 mmol/L 3.5 (L)  3.8     Chloride Latest Ref Range: 96 - 112 mmol/L 99  101     Co2 Latest Ref Range: 20 - 33 mmol/L 24  23     Anion Gap Latest Ref Range: 7.0 - 16.0  14.0  12.0     Glucose Latest Ref Range: 65 - 99 mg/dL 152 (H)  121 (H)     Bun Latest Ref Range: 8 - 22 mg/dL 15  16     Creatinine Latest Ref Range: 0.50 - 1.40 mg/dL 1.18  1.01     GFR If  Latest Ref Range: >60  mL/min/1.73 m 2 55 (A)  >60     GFR If Non  Latest Ref Range: >60 mL/min/1.73 m 2 46 (A)  54 (A)     Calcium Latest Ref Range: 8.5 - 10.5 mg/dL 9.2  8.4 (L)     AST(SGOT) Latest Ref Range: 12 - 45 U/L   32     ALT(SGPT) Latest Ref Range: 2 - 50 U/L   28     Alkaline Phosphatase Latest Ref Range: 30 - 99 U/L   93     Total Bilirubin Latest Ref Range: 0.1 - 1.5 mg/dL   0.5     Albumin Latest Ref Range: 3.2 - 4.9 g/dL   3.6     Total Protein Latest Ref Range: 6.0 - 8.2 g/dL   6.2     Globulin Latest Ref Range: 1.9 - 3.5 g/dL   2.6     A-G Ratio Latest Units: g/dL   1.4     Magnesium Latest Ref Range: 1.5 - 2.5 mg/dL 1.6       Lactic Acid Latest Ref Range: 0.5 - 2.0 mmol/L  3.9 (H) 2.7 (H)  1.9   Glycohemoglobin Latest Ref Range: 4.0 - 5.6 %    5.3    Estim. Avg Glu Latest Units: mg/dL    105      Imaging:   10/25 CT Head for A??MS  Impression  1.  No acute intracranial abnormality is identified, there are nonspecific white matter changes, commonly associated with small vessel ischemic disease.  Associated mild cerebral atrophy is noted.  2.  Right maxillary sinus mucus retention cyst  3.  Atherosclerosis.    10/25 CXR for SOB  ??1.  No acute cardiopulmonary disease.  2.  Round calcification in the right upper quadrant, appearance suggests calcified gallstone.  3.  Atherosclerosis    Problem Representation: Dottie Enamorado is a 66 y.o. female who presented to the ED on 10/25 with altered mental status (Oriented to person and situation), fever, and abdominal pain. She was admitted for sepsis and to investigate etiology of AMS.    Neuropsych: Alert and oriented to person only. Not oriented to place, time, situation. CT Head negative for tumor, hemorrhage or other acute intracranial abnormality. Electrolytes, BUN wnl. Normal cranial exam.   - Continue  mL/hr continuous  - Continue IV Ceftriaxone 2g daily   - Add flagyl until r/o GI source     EENT: Normal EENT exam without evidence of infection.      MSK/Pain: Denies pain. Normal MSK exam without evidence of rash, abrasions, joint tenderness/swelling. Good perfusion.     CVS: Denies SOB. Normal exam with RRR, no m/g/r. Good perfusion with capillary refill <2.     Pulm: Denies SOB, cough. 10/25 CXR showed no acute cardiopulmonary disease. Unlikely pulmonary source of infection.    GI: Denies nausea, vomiting, diarrhea, abdominal pain. Normal abdominal exam without abdominal tenderness, guarding, rebound tenderness. Reports previous LLQ surgery (unable to specify).   - CT Abdomen/Pelvis to investigate source  - If develops diarrhea, check for C Dific[place]    : Denies dysuria. 10/25 UA is cloudy, moderate occult blood, protein (100), ngative for nitrites, negative leukocyte esterase, negative bacteria. WBC (5-10) and RBC (5-10).     Renal: Electrolytes wnl. BUN, Cr wnl. Denies flank pain.     Heme: WBC 34,000. Hg, HCT, plt wnl.     Derm: Skin intact throughout, without abrasion, rash, swelling, mottling.   - continue to monitor skin     ID: T 100.7

## 2021-10-26 NOTE — PROGRESS NOTES
Med Rec completed per pt's caregiver Johanna  Allergies reviewed  No ABX in last 14 days    Pt not taking any RX's or OTC's

## 2021-10-26 NOTE — PROGRESS NOTES
Patient seen in urgent care for confusion and fever.  She was apparently dropped off by EMS for evaluation.  She arrives disheveled at the clinic with no shoes and wearing socks only and pajama bottoms and a pajama top.  She is found to have fever of 102.4 and does not know where she is.  States she does not recall how she arrived here in the urgent care clinic.  She knows what city she is in, but states she does not know where she lives.    Urinalysis is positive for small amounts of leukocytes, negative nitrates.  Covid is negative.  Because of patient's disorientation and significant fever EMS was called to  the patient and take her to the emergency room.

## 2021-10-27 ENCOUNTER — APPOINTMENT (OUTPATIENT)
Dept: RADIOLOGY | Facility: MEDICAL CENTER | Age: 66
DRG: 853 | End: 2021-10-27
Payer: MEDICARE

## 2021-10-27 ENCOUNTER — APPOINTMENT (OUTPATIENT)
Dept: RADIOLOGY | Facility: MEDICAL CENTER | Age: 66
DRG: 853 | End: 2021-10-27
Attending: INTERNAL MEDICINE
Payer: MEDICARE

## 2021-10-27 LAB
ANION GAP SERPL CALC-SCNC: 10 MMOL/L (ref 7–16)
ANION GAP SERPL CALC-SCNC: 11 MMOL/L (ref 7–16)
BASOPHILS # BLD AUTO: 0.2 % (ref 0–1.8)
BASOPHILS # BLD: 0.06 K/UL (ref 0–0.12)
BUN SERPL-MCNC: 17 MG/DL (ref 8–22)
BUN SERPL-MCNC: 18 MG/DL (ref 8–22)
CALCIUM SERPL-MCNC: 8.3 MG/DL (ref 8.5–10.5)
CALCIUM SERPL-MCNC: 8.5 MG/DL (ref 8.5–10.5)
CHLORIDE SERPL-SCNC: 102 MMOL/L (ref 96–112)
CHLORIDE SERPL-SCNC: 103 MMOL/L (ref 96–112)
CO2 SERPL-SCNC: 20 MMOL/L (ref 20–33)
CO2 SERPL-SCNC: 22 MMOL/L (ref 20–33)
CREAT SERPL-MCNC: 0.76 MG/DL (ref 0.5–1.4)
CREAT SERPL-MCNC: 0.84 MG/DL (ref 0.5–1.4)
EOSINOPHIL # BLD AUTO: 0 K/UL (ref 0–0.51)
EOSINOPHIL NFR BLD: 0 % (ref 0–6.9)
ERYTHROCYTE [DISTWIDTH] IN BLOOD BY AUTOMATED COUNT: 46 FL (ref 35.9–50)
GLUCOSE SERPL-MCNC: 128 MG/DL (ref 65–99)
GLUCOSE SERPL-MCNC: 146 MG/DL (ref 65–99)
HCT VFR BLD AUTO: 35.6 % (ref 37–47)
HEMOCCULT STL QL: POSITIVE
HGB BLD-MCNC: 11.8 G/DL (ref 12–16)
IMM GRANULOCYTES # BLD AUTO: 0.38 K/UL (ref 0–0.11)
IMM GRANULOCYTES NFR BLD AUTO: 1.6 % (ref 0–0.9)
LACTATE BLD-SCNC: 1.3 MMOL/L (ref 0.5–2)
LACTATE BLD-SCNC: 1.8 MMOL/L (ref 0.5–2)
LYMPHOCYTES # BLD AUTO: 0.9 K/UL (ref 1–4.8)
LYMPHOCYTES NFR BLD: 3.7 % (ref 22–41)
MAGNESIUM SERPL-MCNC: 1.7 MG/DL (ref 1.5–2.5)
MAGNESIUM SERPL-MCNC: 1.7 MG/DL (ref 1.5–2.5)
MCH RBC QN AUTO: 30.2 PG (ref 27–33)
MCHC RBC AUTO-ENTMCNC: 33.1 G/DL (ref 33.6–35)
MCV RBC AUTO: 91 FL (ref 81.4–97.8)
MONOCYTES # BLD AUTO: 0.46 K/UL (ref 0–0.85)
MONOCYTES NFR BLD AUTO: 1.9 % (ref 0–13.4)
NEUTROPHILS # BLD AUTO: 22.59 K/UL (ref 2–7.15)
NEUTROPHILS NFR BLD: 92.6 % (ref 44–72)
NRBC # BLD AUTO: 0 K/UL
NRBC BLD-RTO: 0 /100 WBC
PLATELET # BLD AUTO: 219 K/UL (ref 164–446)
PMV BLD AUTO: 9.7 FL (ref 9–12.9)
POTASSIUM SERPL-SCNC: 3.2 MMOL/L (ref 3.6–5.5)
POTASSIUM SERPL-SCNC: 3.4 MMOL/L (ref 3.6–5.5)
RBC # BLD AUTO: 3.91 M/UL (ref 4.2–5.4)
SODIUM SERPL-SCNC: 134 MMOL/L (ref 135–145)
SODIUM SERPL-SCNC: 134 MMOL/L (ref 135–145)
WBC # BLD AUTO: 24.4 K/UL (ref 4.8–10.8)

## 2021-10-27 PROCEDURE — A9270 NON-COVERED ITEM OR SERVICE: HCPCS | Performed by: INTERNAL MEDICINE

## 2021-10-27 PROCEDURE — 97161 PT EVAL LOW COMPLEX 20 MIN: CPT

## 2021-10-27 PROCEDURE — C9113 INJ PANTOPRAZOLE SODIUM, VIA: HCPCS | Performed by: INTERNAL MEDICINE

## 2021-10-27 PROCEDURE — 83735 ASSAY OF MAGNESIUM: CPT

## 2021-10-27 PROCEDURE — 770020 HCHG ROOM/CARE - TELE (206)

## 2021-10-27 PROCEDURE — 83630 LACTOFERRIN FECAL (QUAL): CPT

## 2021-10-27 PROCEDURE — 87086 URINE CULTURE/COLONY COUNT: CPT

## 2021-10-27 PROCEDURE — 87493 C DIFF AMPLIFIED PROBE: CPT

## 2021-10-27 PROCEDURE — 700111 HCHG RX REV CODE 636 W/ 250 OVERRIDE (IP)

## 2021-10-27 PROCEDURE — 700102 HCHG RX REV CODE 250 W/ 637 OVERRIDE(OP): Performed by: INTERNAL MEDICINE

## 2021-10-27 PROCEDURE — 83605 ASSAY OF LACTIC ACID: CPT

## 2021-10-27 PROCEDURE — 80048 BASIC METABOLIC PNL TOTAL CA: CPT

## 2021-10-27 PROCEDURE — 36415 COLL VENOUS BLD VENIPUNCTURE: CPT

## 2021-10-27 PROCEDURE — 85025 COMPLETE CBC W/AUTO DIFF WBC: CPT

## 2021-10-27 PROCEDURE — 700117 HCHG RX CONTRAST REV CODE 255

## 2021-10-27 PROCEDURE — 76705 ECHO EXAM OF ABDOMEN: CPT

## 2021-10-27 PROCEDURE — 700111 HCHG RX REV CODE 636 W/ 250 OVERRIDE (IP): Performed by: INTERNAL MEDICINE

## 2021-10-27 PROCEDURE — 87045 FECES CULTURE AEROBIC BACT: CPT

## 2021-10-27 PROCEDURE — 87899 AGENT NOS ASSAY W/OPTIC: CPT

## 2021-10-27 PROCEDURE — 700111 HCHG RX REV CODE 636 W/ 250 OVERRIDE (IP): Performed by: STUDENT IN AN ORGANIZED HEALTH CARE EDUCATION/TRAINING PROGRAM

## 2021-10-27 PROCEDURE — 87040 BLOOD CULTURE FOR BACTERIA: CPT | Mod: 91

## 2021-10-27 PROCEDURE — 74177 CT ABD & PELVIS W/CONTRAST: CPT | Mod: MG

## 2021-10-27 PROCEDURE — 82272 OCCULT BLD FECES 1-3 TESTS: CPT

## 2021-10-27 PROCEDURE — 700105 HCHG RX REV CODE 258: Performed by: STUDENT IN AN ORGANIZED HEALTH CARE EDUCATION/TRAINING PROGRAM

## 2021-10-27 PROCEDURE — 700105 HCHG RX REV CODE 258

## 2021-10-27 PROCEDURE — 74018 RADEX ABDOMEN 1 VIEW: CPT

## 2021-10-27 PROCEDURE — 97165 OT EVAL LOW COMPLEX 30 MIN: CPT

## 2021-10-27 PROCEDURE — 99233 SBSQ HOSP IP/OBS HIGH 50: CPT | Mod: GC | Performed by: INTERNAL MEDICINE

## 2021-10-27 RX ORDER — PANTOPRAZOLE SODIUM 40 MG/10ML
40 INJECTION, POWDER, LYOPHILIZED, FOR SOLUTION INTRAVENOUS ONCE
Status: COMPLETED | OUTPATIENT
Start: 2021-10-27 | End: 2021-10-27

## 2021-10-27 RX ORDER — MAGNESIUM SULFATE HEPTAHYDRATE 40 MG/ML
4 INJECTION, SOLUTION INTRAVENOUS ONCE
Status: COMPLETED | OUTPATIENT
Start: 2021-10-27 | End: 2021-10-27

## 2021-10-27 RX ORDER — TRAMADOL HYDROCHLORIDE 50 MG/1
50 TABLET ORAL ONCE
Status: COMPLETED | OUTPATIENT
Start: 2021-10-27 | End: 2021-10-27

## 2021-10-27 RX ORDER — POTASSIUM CHLORIDE 7.45 MG/ML
10 INJECTION INTRAVENOUS
Status: COMPLETED | OUTPATIENT
Start: 2021-10-27 | End: 2021-10-27

## 2021-10-27 RX ADMIN — POTASSIUM CHLORIDE 10 MEQ: 7.45 INJECTION INTRAVENOUS at 07:54

## 2021-10-27 RX ADMIN — PANTOPRAZOLE SODIUM 40 MG: 40 INJECTION, POWDER, LYOPHILIZED, FOR SOLUTION INTRAVENOUS at 02:07

## 2021-10-27 RX ADMIN — SODIUM CHLORIDE, POTASSIUM CHLORIDE, SODIUM LACTATE AND CALCIUM CHLORIDE: 600; 310; 30; 20 INJECTION, SOLUTION INTRAVENOUS at 23:20

## 2021-10-27 RX ADMIN — PIPERACILLIN AND TAZOBACTAM 3.38 G: 3; .375 INJECTION, POWDER, LYOPHILIZED, FOR SOLUTION INTRAVENOUS; PARENTERAL at 21:44

## 2021-10-27 RX ADMIN — SODIUM CHLORIDE, POTASSIUM CHLORIDE, SODIUM LACTATE AND CALCIUM CHLORIDE: 600; 310; 30; 20 INJECTION, SOLUTION INTRAVENOUS at 05:34

## 2021-10-27 RX ADMIN — TRAMADOL HYDROCHLORIDE 50 MG: 50 TABLET, COATED ORAL at 02:07

## 2021-10-27 RX ADMIN — ENOXAPARIN SODIUM 40 MG: 40 INJECTION SUBCUTANEOUS at 04:38

## 2021-10-27 RX ADMIN — POTASSIUM CHLORIDE 10 MEQ: 7.45 INJECTION INTRAVENOUS at 05:34

## 2021-10-27 RX ADMIN — POTASSIUM CHLORIDE 10 MEQ: 7.45 INJECTION INTRAVENOUS at 06:35

## 2021-10-27 RX ADMIN — PIPERACILLIN AND TAZOBACTAM 3.38 G: 3; .375 INJECTION, POWDER, LYOPHILIZED, FOR SOLUTION INTRAVENOUS; PARENTERAL at 12:52

## 2021-10-27 RX ADMIN — PIPERACILLIN AND TAZOBACTAM 3.38 G: 3; .375 INJECTION, POWDER, LYOPHILIZED, FOR SOLUTION INTRAVENOUS; PARENTERAL at 04:38

## 2021-10-27 RX ADMIN — POTASSIUM CHLORIDE 10 MEQ: 7.45 INJECTION INTRAVENOUS at 04:38

## 2021-10-27 RX ADMIN — IOHEXOL 100 ML: 350 INJECTION, SOLUTION INTRAVENOUS at 19:29

## 2021-10-27 RX ADMIN — MAGNESIUM SULFATE HEPTAHYDRATE 4 G: 40 INJECTION, SOLUTION INTRAVENOUS at 06:35

## 2021-10-27 ASSESSMENT — COGNITIVE AND FUNCTIONAL STATUS - GENERAL
SUGGESTED CMS G CODE MODIFIER DAILY ACTIVITY: CJ
SUGGESTED CMS G CODE MODIFIER MOBILITY: CH
TURNING FROM BACK TO SIDE WHILE IN FLAT BAD: A LITTLE
MOBILITY SCORE: 18
TOILETING: A LITTLE
CLIMB 3 TO 5 STEPS WITH RAILING: A LITTLE
MOVING TO AND FROM BED TO CHAIR: A LITTLE
DAILY ACTIVITIY SCORE: 22
DRESSING REGULAR UPPER BODY CLOTHING: A LITTLE
STANDING UP FROM CHAIR USING ARMS: A LITTLE
DAILY ACTIVITIY SCORE: 21
SUGGESTED CMS G CODE MODIFIER DAILY ACTIVITY: CJ
SUGGESTED CMS G CODE MODIFIER MOBILITY: CK
PERSONAL GROOMING: A LITTLE
MOVING FROM LYING ON BACK TO SITTING ON SIDE OF FLAT BED: A LITTLE
HELP NEEDED FOR BATHING: A LITTLE
MOBILITY SCORE: 24
WALKING IN HOSPITAL ROOM: A LITTLE
HELP NEEDED FOR BATHING: A LITTLE

## 2021-10-27 ASSESSMENT — ENCOUNTER SYMPTOMS
PALPITATIONS: 0
DOUBLE VISION: 0
MYALGIAS: 0
FEVER: 0
DIZZINESS: 0
CHILLS: 0
ABDOMINAL PAIN: 1
HEMOPTYSIS: 0
FALLS: 0
BLURRED VISION: 0
COUGH: 0
HEARTBURN: 0
VOMITING: 0
DIARRHEA: 0
BRUISES/BLEEDS EASILY: 0
DEPRESSION: 0
NAUSEA: 1
HEADACHES: 0

## 2021-10-27 ASSESSMENT — GAIT ASSESSMENTS
DISTANCE (FEET): 150
GAIT LEVEL OF ASSIST: SUPERVISED
DEVIATION: DECREASED BASE OF SUPPORT

## 2021-10-27 ASSESSMENT — ACTIVITIES OF DAILY LIVING (ADL): TOILETING: INDEPENDENT

## 2021-10-27 ASSESSMENT — PAIN DESCRIPTION - PAIN TYPE
TYPE: ACUTE PAIN
TYPE: ACUTE PAIN

## 2021-10-27 ASSESSMENT — FIBROSIS 4 INDEX: FIB4 SCORE: 1.88

## 2021-10-27 NOTE — NON-PROVIDER
Daily Progress Note:     Date of Service: 10/27/2021  Primary Team: anthony   Attending: Kevin Woods M.D.   Senior Resident: Dr. Brennan  Intern: Dr. Dc  Contact:  [phone]    Chief Complaint:   Altered Level of Consciousness     Subjective: Dottie Enamorado is a 66 y.o. female who presented to the ED on 10/25 with altered mental status. She was transfered by EMS from urgent care where she presented disoriented and with stomach pain. Denied abdominal pain, cough, SOB, dysuria, oral temp 102.4. She was transferred for higher level of care due to disorientation (Oriented to person and situation), and fever, and was admitted for ALOC.     Interval History:  Started Piperacillin-tazobactam (zosyn) TID IV on 10/26  Blood Cx positive for beta hemolytic streptococcus group A   Occult blood stool positive  WBC 24.4   CT Abd/pelvis pending    Today,   Patient has improved orientation, now alert and oriented to person, situation, place, but not time. She reports epigastric and RUQ pain, intermittent “for years”, worse sometimes after eating and with spicy foods, with a burning sensation; has not worsened overall. Reports no new symptoms.   Denies headache, fever, chills, nausea, vomiting, diarrhea, rash, SOB, cough, chest pain, dysuria.     Objective Data:   Physical Exam:   Vitals:   Temp:  [36.7 °C (98.1 °F)-38.6 °C (101.4 °F)] 37.5 °C (99.5 °F)  Pulse:  [54-93] 68  Resp:  [15-18] 16  BP: (105-155)/(51-74) 144/69  SpO2:  [93 %-97 %] 94 %  Neuropsych: alert, oriented to person, place, situation, not to time. No acute distress. CN II-XII intact.   Head: Bruising under left eye. Head normocephalic, atraumatic without tenderness, visible or palpable masses, depressions, or scarring.   Eyes: sclera is non-icteric, conjunctivae without exudates or hemorrhage.   Ears: external ear and ear canal are non-tender and withoutswelling. Canal clear without discharge. Hearing intact.   Nose: nares are patent bilaterally. No  rhinnorhea.   Throat: oral muscoa pink and moist, with good dentition. Tongue normal in appearance without lesions and with good symmetrical movement. No buccal nodules or lesions. Pharynx is normal in apparent without tonsillar swelling or exudates.   Neck: Neck is supple without adenopathy. No JVD.  MSK/Derm: No joint swelling, eythema. No rash. Warm well perfused. Pulses palpable.   Cardiac: RRR. No m/g/r. Capillary refill <2 sec.  Respiratory:  CTAB. No accessory muscle use.   Abdominal: Soft. Mild diffuse tenderness to palpation RUQ, epigastric > LUQ,LLQ,RLQ. Positive noble. Guarding RUQ. No rebound tenderness. No visible lesions or scars. Bowel sounds are present and normoactive. No masses, hepatomegaly or splenomegaly.     Labs:   Recent Labs     10/25/21  2105 10/26/21  0015 10/27/21  0111   WBC 38.2* 34.0* 24.4*   RBC 4.50 4.02* 3.91*   HEMOGLOBIN 13.8 12.5 11.8*   HEMATOCRIT 40.9 36.9* 35.6*   MCV 90.9 91.8 91.0   MCH 30.7 31.1 30.2   RDW 45.9 47.1 46.0   PLATELETCT 299 276 219   MPV 9.2 9.2 9.7   NEUTSPOLYS 93.80* 93.20* 92.60*   LYMPHOCYTES 1.70* 2.40* 3.70*   MONOCYTES 2.50 2.60 1.90   EOSINOPHILS 0.10 0.00 0.00   BASOPHILS 0.30 0.30 0.20     Recent Labs     10/26/21  0015 10/27/21  0111 10/27/21  0237   SODIUM 136 134* 134*   POTASSIUM 3.8 3.4* 3.2*   CHLORIDE 101 103 102   CO2 23 20 22   GLUCOSE 121* 128* 146*   BUN 16 18 17       Imaging:   US-RUQ   Final Result         1.  Fixed stone at the gallbladder neck with gallbladder distention and gallbladder filled with sludge with positive sonographic Noble's sign, however normal gallbladder wall thickness. Findings concerning but insufficient for diagnosis of    cholecystitis. Could be further evaluated with HIDA scan.   2.  Hepatomegaly and echogenic liver, compatible with fatty change versus fibrosis.   3.  Atherosclerosis      [id number] VIEW   Final Result         1. Nonobstructive bowel gas pattern.   2. Centrally lucent gallstone is present.       CT-HEAD W/O   Final Result         1.  No acute intracranial abnormality is identified, there are nonspecific white matter changes, commonly associated with small vessel ischemic disease.  Associated mild cerebral atrophy is noted.   2.  Right maxillary sinus mucus retention cyst   3.  Atherosclerosis.      DX-CHEST-PORTABLE (1 VIEW)   Final Result         1.  No acute cardiopulmonary disease.   2.  Round calcification in the right upper quadrant, appearance suggests calcified gallstone.   3.  Atherosclerosis      CT-ABDOMEN-PELVIS WITH    (Results Pending)       Assessment and Plan:  Dottie Enamorado is a 66 y.o. female who presented to the ED on 10/25 with altered mental status (Oriented to person and situation), fever, and abdominal pain. She was admitted for sepsis and to investigate etiology of AMS.    #sepsis, unknown etiology  +SIRS and 2/3 Qsofa. Possible asymptomatic pyuria, less likely source of  sepsis. Nitrite negative would be staph or enterococcus. No diarrhea to suggest C. Dif. Blood Cx - gram positive cocci- possible strep. High ANC, presumed bacterial sepsis, inflammatory, drug hypersensitivity. Neoplastic causes not apparent.   -Continue Piperacillin-tazobactam (zosyn) TID IV and Ceftriaxone to cover anaerobes   -Skin checks to investigate possible derm source  -CT Abd/pelvis results pending - to investigate abdominal source (gallbladder, diverticulitis)    #decompensated dementia with encephalopathy, likely 2/2 systemic sepsis  AOx3. 10/25 CT Head no acute intracranial abnormality. No meningismus.   -Continue  mL/hr continuous  -Treat sepsis    #cholecystitis vs GERD vs diverticulitis  Previous u/s showed gallstones, without pericholecystic fluid, without wall thickening.   -CT Abd/pelvis results pending   -Consider HIDA - to investigate duct occlusion     #hypokalemia  K+ 3.4  - Continue KCl supplementation

## 2021-10-27 NOTE — PROGRESS NOTES
Patient update:     1:42am  Patient complaining of 10/10 abdominal pain. Assessed at bedside.     S- Lying in bed, in mild distress. Complaining of 8/10 RUQ pain, dull character, starting in epigastrium and moving to RUQ. Has had the pain for many days, but started again in the night. Poor historian. Has had multiple loose BMs today(not classical Cdiff appearance per RN), no melena/hematochezia.     O: Has spiked multiple temperatures today, 101.4 at 2323, 99.8 after tylenol. Other VSS.   Abdomen exam: Tenderness to palpation of RUQ, LUQ, RLQ; No rebound or rigidity, BS + in all 4 quadrants    A/P:  Epigastric/RUQ pain, more suggestive of cholelithiasis vs peptic ulcer disease. CTAP pending. BCx 1/2 Possible strep sp.     -Will get stat abdominal X ray  -Repeat BCX  -C diff, fecal lactoferrin  -RUQ US, I am more inclined towards hepatobiliary etiology based on her presentation  -Pantoprazole 40mg IV once  -F/w CTAP w/ contrast

## 2021-10-27 NOTE — PROGRESS NOTES
[facility] INTERNAL MEDICINE ATTENDING NOTE:   Kevin Woods MD      Visit Time:   Attending/resident bedside rounds 9-11:30 AM     PATIENT ID  Name:             Dottie Enamorado     YOB: 1955  Age:                 66 y.o.  female   MRN:               [id number]  Admit:  10/25/2021     I saw and examined the patient and discussed the management with the resident staff.  I reviewed the resident's note and agree with the resident's findings and plan as documented in the resident's note except as documented in the attending note. Please reference resident daily note for complete information.    The chart was reviewed and summarized.  Available labs, imaging, O2 sats ,  EKGs were reviewed. Available nursing, consultant, and resident notes were reviewed. I am actively involved in the patient's care.                                                                              ______________________________________________________________________            66(  admit Oct 25, IP, sepsis   )  INTERVAL:  Chart reviewed/summarized,       Oct 26AM:   confused, nonfocal, baseline? , no JVD, lungs clear, abd soft, no skin lessions, unclear source of sepsis, +BC ?  - empiric vanco, C3, CT abdomen for occult process   AF, HR 98 --> 74, , 98% RA, 126 #  WBC 34-38, HB 12-14, -299, ANC 31-60467,   Na 136,  K3.8, CO2 23, -152, BUN 16, CR 1 (1.18), CA 8.4 (9.2), AST/ALT, ALP , wnl, BR 0.5      MEDR: C3 2 gram Q24h, enox LD, bowel, LR,      CORE:  Code Status (  FULL)  jessica Spencer [phone]  --------------------------------------------------------------------------------------------------  Hospital Summary/ Patient System Review      NP:   *admit(  confusion, fever, temp 102,  nonfocal, no meningismus  CT head Aug 25: not acute, SVID, right maxillary cys, ASVD  Impression: decompensated dementia ?  - -acute sepsis encephalopathy  --> sepsis, fluid repair      EENT:   *admit(  bruise face , no  meningismus, edentulous, no ear, sinus pain     MSK/PAIN:   *admit(  neg     CVS:   *admit(  , H R98,  no apparent HF, no edema, no JVD, pBNP 1617 , clinically no acute HF      PULM:   *admit(  remote smoker , 94% RA, lungs clear , incr RR      GI:   *admit(  BMI 24,  acute NVD x 1 day, AST/ALT, ALP , wnl, BR 0.5 , soft, no obvious peritonitis  Impression: gastritis, Cdiff ? , sepsis -- fluid repair, Lipase, on time NV meds, COVID testign      :   *admit(  UA: WBC, neg nitrates, no bacteria, WBC 5-10, RBC 5-10, hyaline casts 3-5, lactic acid 2.7   Impression: pyuria, no  s/s however -- UTI sepsis not excluded      RENAL:   *admit(  Na 136,  K3.8, CO2 23, -152, BUN 16, CR 1 (1.18), CA 8.4 (9.2),      HEME:   *admit(  WBC 34-38, HB 12-14, -299, ANC 31-35019,   Impression: ANC high, sepsis, source ?         ENDO:   *admit(  ,  A1C 5.3,  TSH 0.820      DERM/BREAST:   *admit(       ID:   *admit(  SIRS, Qsofa 2/3, fever reported (not documented) , WBC high, procalcitonin 30 , ESR 40, CRP 15   Impression: SEPSIS, with encephalopathy,  , GI source ?  BC strep ?   -- fluid repair, C3/flagyl -> zosyn , abdominal imaging if not improving   Impression: hx COVID Aug 2021

## 2021-10-27 NOTE — PROGRESS NOTES
Pt complaining of 10/10 sharp abdominal pain. Right side is very tender when palpated; left side not tender. MD Patricia to bedside to assess

## 2021-10-27 NOTE — PROGRESS NOTES
Daily Progress Note:     Date of Service: 10/27/2021  Primary Team: GEMMAR IM Gray Team   Attending: Kevin Woods M.D.   Senior Resident: Dr. Huynh  Intern: Dr. Dc  Contact:  [phone]     Chief Complaint:  AMS     ID:  Ms. Enamorado is a 65 yo F with unknown PMHx 2/2 to AMS admitted to floor as a transfer from urgent care where she presented yesterday (10/25/2021) for fever, chills.     Subjective:  Nursing staff called night team because pt was complaining of 8/10 abd pain.  Upon my encounter pt c/o 6/10 abd pain only. Pt spiked a fever of 101.4F at 2323.  During my encouncter, pt was able to explain that she knows that she has dementia for about a year and lives with her cousin, but pt is unable to provide details to her PMHx.  Denies fever, chills, CP, palpitations, abd pain, N/V/D.  Pt states that she lives at home with her cousins in [place].  No family member available at bedside to provide more insight to neurological baseline.     Interval History:   Overnight night team noted RUQ/Epigastric abd pain Ordered abdominal X ray, repeat BCX due to 101.4, c diff, fecal lactoferrin, RUQ US, pantoprazole 40mg IV once ordered due to positive stool occult test. RUQ US showed radiologic signs gallstones in the gallbladder.  Gallstone was also visible on abd XR.  Pt kept NPO overnight with abd treatment.  F/w imaging CT AP with contrast still pending.  BCx species positive for GAS.     Review of Systems:    Review of Systems   Constitutional: Negative for chills and fever.   HENT: Negative for hearing loss and tinnitus.    Eyes: Negative for blurred vision and double vision.   Respiratory: Negative for cough and hemoptysis.    Cardiovascular: Negative for chest pain and palpitations.   Gastrointestinal: Positive for abdominal pain and nausea. Negative for diarrhea, heartburn and vomiting.   Genitourinary: Negative for dysuria.   Musculoskeletal: Negative for falls and myalgias.   Neurological: Negative for dizziness and  headaches.   Endo/Heme/Allergies: Negative for environmental allergies. Does not bruise/bleed easily.   Psychiatric/Behavioral: Negative for depression and suicidal ideas.       Objective Data:   Physical Exam:   Vitals:   Temp:  [36.6 °C (97.9 °F)-38.6 °C (101.4 °F)] 36.7 °C (98.1 °F)  Pulse:  [54-95] 54  Resp:  [16-24] 16  BP: ()/(51-95) 121/66  SpO2:  [93 %-99 %] 97 %    Physical Exam  Constitutional:       General: She is not in acute distress.     Appearance: She is normal weight.   HENT:      Head: Normocephalic and atraumatic.      Right Ear: Tympanic membrane normal.      Left Ear: Tympanic membrane normal.      Nose: No congestion or rhinorrhea.      Mouth/Throat:      Mouth: Mucous membranes are moist.      Pharynx: Oropharynx is clear. No oropharyngeal exudate or posterior oropharyngeal erythema.   Eyes:      General: No scleral icterus.        Right eye: No discharge.         Left eye: No discharge.      Conjunctiva/sclera: Conjunctivae normal.      Pupils: Pupils are equal, round, and reactive to light.   Cardiovascular:      Rate and Rhythm: Normal rate and regular rhythm.      Heart sounds: No murmur heard.   No friction rub. No gallop.    Pulmonary:      Effort: No respiratory distress.      Breath sounds: Normal breath sounds. No stridor. No wheezing or rhonchi.   Abdominal:      General: Abdomen is flat.      Palpations: Abdomen is soft.      Tenderness: There is abdominal tenderness (RUQ). There is guarding (RUQ).   Musculoskeletal:         General: No swelling or tenderness. Normal range of motion.      Cervical back: Normal range of motion and neck supple. No rigidity.   Skin:     Capillary Refill: Capillary refill takes less than 2 seconds.      Comments: Skin exam was examined for dermal source for sepsis with chaperone present.  No skin tears, or lesions were found. Skin is clean, dry and intact.   Neurological:      Mental Status: She is alert.      Comments: Alert and oriented to self  and place.           Labs:   Recent Results (from the past 24 hour(s))   LACTIC ACID    Collection Time: 10/26/21 10:47 AM   Result Value Ref Range    Lactic Acid 1.9 0.5 - 2.0 mmol/L   Lactic Acid Every four hours after STAT order    Collection Time: 10/26/21  2:58 PM   Result Value Ref Range    Lactic Acid 1.6 0.5 - 2.0 mmol/L   EKG    Collection Time: 10/26/21  3:03 PM   Result Value Ref Range    Report       [facility]    Test Date:  2021-10-26  Pt Name:    HAJA ANNE            Department: 183  MRN:        [id number]                      Room:       [facility]  Gender:     Female                       Technician: TX  :        1955                   Requested By:QUAN GAMBOA  Order #:    [id number]                    Reading MD: Cuate Kelly MD    Measurements  Intervals                                Axis  Rate:       66                           P:          74  NY:         208                          QRS:        41  QRSD:       88                           T:          68  QT:         392  QTc:        411    Interpretive Statements  SINUS RHYTHM  BORDERLINE T ABNORMALITIES, LATERAL LEADS  No previous ECG available for comparison  Electronically Signed On 10- 19:55:28 PDT by Cuate Kelly MD     CBC WITH DIFFERENTIAL    Collection Time: 10/27/21  1:11 AM   Result Value Ref Range    WBC 24.4 (H) 4.8 - 10.8 K/uL    RBC 3.91 (L) 4.20 - 5.40 M/uL    Hemoglobin 11.8 (L) 12.0 - 16.0 g/dL    Hematocrit 35.6 (L) 37.0 - 47.0 %    MCV 91.0 81.4 - 97.8 fL    MCH 30.2 27.0 - 33.0 pg    MCHC 33.1 (L) 33.6 - 35.0 g/dL    RDW 46.0 35.9 - 50.0 fL    Platelet Count 219 164 - 446 K/uL    MPV 9.7 9.0 - 12.9 fL    Neutrophils-Polys 92.60 (H) 44.00 - 72.00 %    Lymphocytes 3.70 (L) 22.00 - 41.00 %    Monocytes 1.90 0.00 - 13.40 %    Eosinophils 0.00 0.00 - 6.90 %    Basophils 0.20 0.00 - 1.80 %    Immature Granulocytes 1.60 (H) 0.00 - 0.90 %    Nucleated RBC 0.00 /100 WBC    Neutrophils (Absolute)  22.59 (H) 2.00 - 7.15 K/uL    Lymphs (Absolute) 0.90 (L) 1.00 - 4.80 K/uL    Monos (Absolute) 0.46 0.00 - 0.85 K/uL    Eos (Absolute) 0.00 0.00 - 0.51 K/uL    Baso (Absolute) 0.06 0.00 - 0.12 K/uL    Immature Granulocytes (abs) 0.38 (H) 0.00 - 0.11 K/uL    NRBC (Absolute) 0.00 K/uL   Basic Metabolic Panel    Collection Time: 10/27/21  1:11 AM   Result Value Ref Range    Sodium 134 (L) 135 - 145 mmol/L    Potassium 3.4 (L) 3.6 - 5.5 mmol/L    Chloride 103 96 - 112 mmol/L    Co2 20 20 - 33 mmol/L    Glucose 128 (H) 65 - 99 mg/dL    Bun 18 8 - 22 mg/dL    Creatinine 0.84 0.50 - 1.40 mg/dL    Calcium 8.3 (L) 8.5 - 10.5 mg/dL    Anion Gap 11.0 7.0 - 16.0   MAGNESIUM    Collection Time: 10/27/21  1:11 AM   Result Value Ref Range    Magnesium 1.7 1.5 - 2.5 mg/dL   ESTIMATED GFR    Collection Time: 10/27/21  1:11 AM   Result Value Ref Range    GFR If African American >60 >60 mL/min/1.73 m 2    GFR If Non African American >60 >60 mL/min/1.73 m 2   Basic Metabolic Panel    Collection Time: 10/27/21  2:37 AM   Result Value Ref Range    Sodium 134 (L) 135 - 145 mmol/L    Potassium 3.2 (L) 3.6 - 5.5 mmol/L    Chloride 102 96 - 112 mmol/L    Co2 22 20 - 33 mmol/L    Glucose 146 (H) 65 - 99 mg/dL    Bun 17 8 - 22 mg/dL    Creatinine 0.76 0.50 - 1.40 mg/dL    Calcium 8.5 8.5 - 10.5 mg/dL    Anion Gap 10.0 7.0 - 16.0   MAGNESIUM    Collection Time: 10/27/21  2:37 AM   Result Value Ref Range    Magnesium 1.7 1.5 - 2.5 mg/dL   LACTIC ACID    Collection Time: 10/27/21  2:37 AM   Result Value Ref Range    Lactic Acid 1.8 0.5 - 2.0 mmol/L   ESTIMATED GFR    Collection Time: 10/27/21  2:37 AM   Result Value Ref Range    GFR If African American >60 >60 mL/min/1.73 m 2    GFR If Non African American >60 >60 mL/min/1.73 m 2   OCCULT BLOOD STOOL    Collection Time: 10/27/21  2:50 AM   Result Value Ref Range    Occult Blood Feces Positive (A) Negative       Imaging:   Independant Imaging Review: Completed  CT-HEAD W/O   Final Result         1.   No acute intracranial abnormality is identified, there are nonspecific white matter changes, commonly associated with small vessel ischemic disease.  Associated mild cerebral atrophy is noted.   2.  Right maxillary sinus mucus retention cyst   3.  Atherosclerosis.      DX-CHEST-PORTABLE (1 VIEW)   Final Result         1.  No acute cardiopulmonary disease.   2.  Round calcification in the right upper quadrant, appearance suggests calcified gallstone.   3.  Atherosclerosis      CT-ABDOMEN WITH & W/O    (Results Pending)   [id number] VIEW    (Results Pending)   US-RUQ    (Results Pending)       Problem Representation:     Calculous cholecystitis  - US showed: Fixed stone at the gallbladder neck with gallbladder distention and gallbladder filled with sludge with positive sonographic Noble's sign, however normal gallbladder wall thickness. F/u HIDA.  -pt is on Zosyn. LR.  - will consult surgery.    Sepsis  - qSOFA 2. SIRS 2/4 fulfilled.  - source intraabdominal  - pt is on Zosyn.  Treated with Flagyl and ceftriaxone on 10/26.  - procal 29.21 yesterday.  LA 1.3 today    Altered Mental Status  - pt has a h/o dementia for the past year  - no family available to assess baseline       Tubes: none  Lines: pIV (RUE)  Drains: none  Fluids: LR  Diet: Regular  DVT prophylaxis: enoxaparin  GI prophylaxis: none  Antibiotics: ceftriaxone and zosyn  Code Status: FULL  Disposition: Admitted to telemetry for empiric antbiotic treatment of sepsis.

## 2021-10-27 NOTE — DISCHARGE PLANNING
Met with pt at bedside to verify face sheet and complete assessment. Pt states she lives in a single story home with 2 steps into the home. She lives with her aunt (Arlin Figueroa) and her .per pt her aunt provides transportation and helps with care as needed.  Pt states she moved here a couple months ago and the rest of her family/friends support are in [place].     Pt reports she is independent with ADLS, her aunt helps her with transportation as needed. Pt states she has not driven in a few months due to dx of dementia. Pt denies DME use. Pt denies alcohol or substance abuse, pt denies mental illness.     Pt stated she has not yet established a PCP in the area. Pt does not have AD.     Upon DC pt states her aunt will be able to pick  her up.     Anticipated discharge disposition: home with family support    Action: met with pt at bedside to complete assessment  Attempted to call emergency contact phone number listed, no answer VM box not set up. Unable to leave . Pt stated she would like to add her aunt as emergency contact but does not know the phone number.    Requested NOK search from [facility] .     Barriers to discharge: no PCP, pt unsure of home address    Plan: will follow up with NOK search results.     Care Transition Team Assessment    Information Source  Orientation Level: Oriented to time  Information Given By: Patient  Informant's Name: Dottie Enamorado  Who is responsible for making decisions for patient? : Patient    Readmission Evaluation  Is this a readmission?: No    Elopement Risk  Legal Hold: No  Ambulatory or Self [place] in Wheelchair: Yes  Disoriented: Time-At Risk for Elopement  Elopement Risk: At Risk for Elopement  Wanderguard On: Unavailable  Personal Belongings: Anything Considered Risk for Security or Elopement, Removed and Stored in Secure Area (Specify Area), Hospital Clothing Only  Environmental Precautions: Sharp or Dangerous Items Removed  Picture of Patient  on Inside Chart Front Cover: No (See Comments)  Purple Armband on Patient: No (See Comments)         Discharge Preparedness  What is your plan after discharge?: Home with help  What are your discharge supports?: Other (comment) (aunt)  Prior Functional Level: Ambulatory, Independent with Activities of Daily Living, Needs Assist with Medication Management  Difficulity with IADLs: Keeping track of finances, Managing medication, Driving    Functional Assesment  Prior Functional Level: Ambulatory, Independent with Activities of Daily Living, Needs Assist with Medication Management    Finances  Financial Barriers to Discharge: No  Prescription Coverage: Yes              Advance Directive  Advance Directive?: None    Domestic Abuse  Have you ever been the victim of abuse or violence?: No    Psychological Assessment  History of Substance Abuse: None  History of Psychiatric Problems: No  Non-compliant with Treatment: No  Newly Diagnosed Illness: No    Discharge Risks or Barriers  Discharge risks or barriers?: No PCP  Patient risk factors: Cognitive / sensory / physical deficit, No PCP    Anticipated Discharge Information  Discharge Disposition: Discharged to home/self care (01)

## 2021-10-27 NOTE — PROGRESS NOTES
Daily Progress Note:     Date of Service: 10/26/2021  Primary Team: [facility] Team   Attending: Kevin Woods M.D.   Senior Resident: Dr. Huynh  Intern: Dr. Dc  Contact:  [phone]     Chief Complaint:  AMS     ID:  Ms. Enamorado is a 67 yo F with unknown PMHx 2/2 to AMS admitted to floor as a transfer from urgent care where she presented yesterday (10/25/2021) for fever, chills.     Subjective:  During my encouncter, pt was able to explain that she knows that she has dementia for about a year and lives with her cousin, but pt is unable to provide details to her PMHx.  Denies fever, chills, CP, palpitations, abd pain, N/V/D.  Pt states that she lives at home with her cousins in [place].  No family member available at bedside to provide more insight to neurological baseline.     Interval History:  qSOFA 2. SIRS criteria met.  WBC 34.0. CRP 15.44. procal 30.83.Tachycardic. BNP 1317. UA negative for nitrite, bacteria, but with significant proteinuria, hyaline cast, occult blood and RBCs.  Admitted to telemetry. Ct head unremarkable. CXR did not show any acute cardiopulmonary process.  No source identified from workup in ED.  Pending CT abd and pelvis. Treating sepsis emprically with ceftriaxone and Zosyn.  BCx are specific for strep ssp, and will speciate on 10/27.    Review of Systems:    Review of Systems   Constitutional: Negative for chills and fever.   HENT: Negative for hearing loss and tinnitus.    Eyes: Negative for blurred vision and double vision.   Respiratory: Negative for cough and hemoptysis.    Cardiovascular: Negative for chest pain and palpitations.   Gastrointestinal: Negative for heartburn and nausea.   Genitourinary: Negative for dysuria and urgency.   Musculoskeletal: Negative for myalgias and neck pain.   Skin: Negative for itching and rash.        (+) abrasion to R forehead   Neurological: Negative for dizziness and headaches.   Psychiatric/Behavioral: Negative for depression and suicidal  ideas.       Objective Data:   Physical Exam:   Vitals:   Temp:  [36.6 °C (97.9 °F)-38.4 °C (101.1 °F)] 37.4 °C (99.3 °F)  Pulse:  [68-98] 93  Resp:  [16-34] 18  BP: ()/(50-95) 105/58  SpO2:  [90 %-99 %] 94 %    Physical Exam  Vitals and nursing note reviewed.   Constitutional:       Appearance: Normal appearance.   HENT:      Head: Normocephalic.      Comments: (+) old, well-healing 2 cm abrasion noted R forehead consistent with fall pt had at home.     Right Ear: Tympanic membrane normal.      Left Ear: Tympanic membrane normal.      Nose: No congestion or rhinorrhea.      Mouth/Throat:      Mouth: Mucous membranes are dry.      Pharynx: No oropharyngeal exudate or posterior oropharyngeal erythema.   Cardiovascular:      Rate and Rhythm: Tachycardia present.      Pulses: Normal pulses.      Heart sounds: No murmur heard.   No gallop.    Pulmonary:      Effort: Pulmonary effort is normal. No respiratory distress.      Breath sounds: No stridor. No wheezing.   Abdominal:      General: Abdomen is flat. Bowel sounds are normal. There is no distension.      Palpations: Abdomen is soft. There is no mass.      Tenderness: There is no abdominal tenderness.   Musculoskeletal:         General: Normal range of motion.      Cervical back: Normal range of motion and neck supple.      Right lower leg: No edema.      Left lower leg: No edema.   Skin:     General: Skin is warm and dry.      Capillary Refill: Capillary refill takes less than 2 seconds.      Coloration: Skin is not jaundiced.   Neurological:      General: No focal deficit present.      Mental Status: She is alert.      Cranial Nerves: No cranial nerve deficit.      Motor: No weakness.      Gait: Gait normal.      Comments: Alert and oriented to person.  And place with encouragement and clues.           Labs:   Recent Results (from the past 24 hour(s))   CBC WITH DIFFERENTIAL    Collection Time: 10/25/21  9:05 PM   Result Value Ref Range    WBC 38.2 (HH) 4.8 -  10.8 K/uL    RBC 4.50 4.20 - 5.40 M/uL    Hemoglobin 13.8 12.0 - 16.0 g/dL    Hematocrit 40.9 37.0 - 47.0 %    MCV 90.9 81.4 - 97.8 fL    MCH 30.7 27.0 - 33.0 pg    MCHC 33.7 33.6 - 35.0 g/dL    RDW 45.9 35.9 - 50.0 fL    Platelet Count 299 164 - 446 K/uL    MPV 9.2 9.0 - 12.9 fL    Neutrophils-Polys 93.80 (H) 44.00 - 72.00 %    Lymphocytes 1.70 (L) 22.00 - 41.00 %    Monocytes 2.50 0.00 - 13.40 %    Eosinophils 0.10 0.00 - 6.90 %    Basophils 0.30 0.00 - 1.80 %    Immature Granulocytes 1.60 (H) 0.00 - 0.90 %    Nucleated RBC 0.00 /100 WBC    Neutrophils (Absolute) 35.81 (H) 2.00 - 7.15 K/uL    Lymphs (Absolute) 0.65 (L) 1.00 - 4.80 K/uL    Monos (Absolute) 0.97 (H) 0.00 - 0.85 K/uL    Eos (Absolute) 0.04 0.00 - 0.51 K/uL    Baso (Absolute) 0.12 0.00 - 0.12 K/uL    Immature Granulocytes (abs) 0.63 (H) 0.00 - 0.11 K/uL    NRBC (Absolute) 0.00 K/uL   BASIC METABOLIC PANEL    Collection Time: 10/25/21  9:05 PM   Result Value Ref Range    Sodium 137 135 - 145 mmol/L    Potassium 3.5 (L) 3.6 - 5.5 mmol/L    Chloride 99 96 - 112 mmol/L    Co2 24 20 - 33 mmol/L    Glucose 152 (H) 65 - 99 mg/dL    Bun 15 8 - 22 mg/dL    Creatinine 1.18 0.50 - 1.40 mg/dL    Calcium 9.2 8.5 - 10.5 mg/dL    Anion Gap 14.0 7.0 - 16.0   ESTIMATED GFR    Collection Time: 10/25/21  9:05 PM   Result Value Ref Range    GFR If  55 (A) >60 mL/min/1.73 m 2    GFR If Non  46 (A) >60 mL/min/1.73 m 2   Prothrombin time (INR)    Collection Time: 10/25/21  9:05 PM   Result Value Ref Range    PT 14.6 12.0 - 14.6 sec    INR 1.17 (H) 0.87 - 1.13   Magnesium    Collection Time: 10/25/21  9:05 PM   Result Value Ref Range    Magnesium 1.6 1.5 - 2.5 mg/dL   Procalcitonin    Collection Time: 10/25/21  9:05 PM   Result Value Ref Range    Procalcitonin 30.73 (H) <0.25 ng/mL   TSH WITH REFLEX TO FT4    Collection Time: 10/25/21  9:05 PM   Result Value Ref Range    TSH 0.820 0.380 - 5.330 uIU/mL   URINALYSIS CULTURE, IF INDICATED     Collection Time: 10/25/21 10:33 PM    Specimen: Urine   Result Value Ref Range    Color DK Yellow     Character Cloudy (A)     Specific Gravity 1.031 <1.035    Ph 5.0 5.0 - 8.0    Glucose Negative Negative mg/dL    Ketones Trace (A) Negative mg/dL    Protein 100 (A) Negative mg/dL    Bilirubin Small (A) Negative    Urobilinogen, Urine 0.2 Negative    Nitrite Negative Negative    Leukocyte Esterase Negative Negative    Occult Blood Moderate (A) Negative    Micro Urine Req Microscopic    URINE MICROSCOPIC (W/UA)    Collection Time: 10/25/21 10:33 PM   Result Value Ref Range    WBC 5-10 (A) /hpf    RBC 5-10 (A) /hpf    Bacteria Negative None /hpf    Epithelial Cells Few /hpf    Mucous Threads Few /hpf    Hyaline Cast 3-5 (A) /lpf    Granular Casts 0-2 /lpf   BLOOD CULTURE    Collection Time: 10/25/21 10:54 PM    Specimen: Peripheral; Blood   Result Value Ref Range    Significant Indicator POS (POS)     Source BLD     Site PERIPHERAL     Culture Result (A)      Growth detected by Bactec instrument. 10/26/2021  14:28  Gram Stain: Gram positive cocci: Possible Streptococcus sp.     LACTIC ACID    Collection Time: 10/25/21 10:54 PM   Result Value Ref Range    Lactic Acid 3.9 (H) 0.5 - 2.0 mmol/L   Lactic Acid Every four hours after STAT order    Collection Time: 10/26/21 12:15 AM   Result Value Ref Range    Lactic Acid 2.7 (H) 0.5 - 2.0 mmol/L   Lipid Profile (Lipid Panel) Fasting    Collection Time: 10/26/21 12:15 AM   Result Value Ref Range    Cholesterol,Tot 141 100 - 199 mg/dL    Triglycerides 71 0 - 149 mg/dL    HDL 43 >=40 mg/dL    LDL 84 <100 mg/dL   Comp Metabolic Panel (CMP)    Collection Time: 10/26/21 12:15 AM   Result Value Ref Range    Sodium 136 135 - 145 mmol/L    Potassium 3.8 3.6 - 5.5 mmol/L    Chloride 101 96 - 112 mmol/L    Co2 23 20 - 33 mmol/L    Anion Gap 12.0 7.0 - 16.0    Glucose 121 (H) 65 - 99 mg/dL    Bun 16 8 - 22 mg/dL    Creatinine 1.01 0.50 - 1.40 mg/dL    Calcium 8.4 (L) 8.5 - 10.5 mg/dL     AST(SGOT) 32 12 - 45 U/L    ALT(SGPT) 28 2 - 50 U/L    Alkaline Phosphatase 93 30 - 99 U/L    Total Bilirubin 0.5 0.1 - 1.5 mg/dL    Albumin 3.6 3.2 - 4.9 g/dL    Total Protein 6.2 6.0 - 8.2 g/dL    Globulin 2.6 1.9 - 3.5 g/dL    A-G Ratio 1.4 g/dL   CBC with Differential    Collection Time: 10/26/21 12:15 AM   Result Value Ref Range    WBC 34.0 (HH) 4.8 - 10.8 K/uL    RBC 4.02 (L) 4.20 - 5.40 M/uL    Hemoglobin 12.5 12.0 - 16.0 g/dL    Hematocrit 36.9 (L) 37.0 - 47.0 %    MCV 91.8 81.4 - 97.8 fL    MCH 31.1 27.0 - 33.0 pg    MCHC 33.9 33.6 - 35.0 g/dL    RDW 47.1 35.9 - 50.0 fL    Platelet Count 276 164 - 446 K/uL    MPV 9.2 9.0 - 12.9 fL    Neutrophils-Polys 93.20 (H) 44.00 - 72.00 %    Lymphocytes 2.40 (L) 22.00 - 41.00 %    Monocytes 2.60 0.00 - 13.40 %    Eosinophils 0.00 0.00 - 6.90 %    Basophils 0.30 0.00 - 1.80 %    Immature Granulocytes 1.50 (H) 0.00 - 0.90 %    Nucleated RBC 0.00 /100 WBC    Neutrophils (Absolute) 31.76 (H) 2.00 - 7.15 K/uL    Lymphs (Absolute) 0.80 (L) 1.00 - 4.80 K/uL    Monos (Absolute) 0.88 (H) 0.00 - 0.85 K/uL    Eos (Absolute) 0.00 0.00 - 0.51 K/uL    Baso (Absolute) 0.10 0.00 - 0.12 K/uL    Immature Granulocytes (abs) 0.50 (H) 0.00 - 0.11 K/uL    NRBC (Absolute) 0.00 K/uL   Sed Rate    Collection Time: 10/26/21 12:15 AM   Result Value Ref Range    Sed Rate Westergren 40 (H) 0 - 25 mm/hour   CRP QUANTITIVE (NON-CARDIAC)    Collection Time: 10/26/21 12:15 AM   Result Value Ref Range    Stat C-Reactive Protein 15.44 (H) 0.00 - 0.75 mg/dL   proBrain Natriuretic Peptide, NT    Collection Time: 10/26/21 12:15 AM   Result Value Ref Range    NT-proBNP 1617 (H) 0 - 125 pg/mL   ESTIMATED GFR    Collection Time: 10/26/21 12:15 AM   Result Value Ref Range    GFR If African American >60 >60 mL/min/1.73 m 2    GFR If Non African American 54 (A) >60 mL/min/1.73 m 2   HEMOGLOBIN A1C    Collection Time: 10/26/21  3:56 AM   Result Value Ref Range    Glycohemoglobin 5.3 4.0 - 5.6 %    Est Avg Glucose  105 mg/dL   PROCALCITONIN    Collection Time: 10/26/21  3:56 AM   Result Value Ref Range    Procalcitonin 29.21 (H) <0.25 ng/mL   LACTIC ACID    Collection Time: 10/26/21 10:47 AM   Result Value Ref Range    Lactic Acid 1.9 0.5 - 2.0 mmol/L   Lactic Acid Every four hours after STAT order    Collection Time: 10/26/21  2:58 PM   Result Value Ref Range    Lactic Acid 1.6 0.5 - 2.0 mmol/L   EKG    Collection Time: 10/26/21  3:03 PM   Result Value Ref Range    Report       [facility]    Test Date:  2021-10-26  Pt Name:    HAJA ANNE            Department: 183  MRN:        [id number]                      Room:       T801  Gender:     Female                       Technician: YAHIR  :        1955                   Requested By:QUAN GAMBOA  Order #:    [id number]                    Reading MD: Cuate Kelly MD    Measurements  Intervals                                Axis  Rate:       66                           P:          74  WI:         208                          QRS:        41  QRSD:       88                           T:          68  QT:         392  QTc:        411    Interpretive Statements  SINUS RHYTHM  BORDERLINE T ABNORMALITIES, LATERAL LEADS  No previous ECG available for comparison  Electronically Signed On 10- 19:55:28 PDT by Cuate Kelly MD         Imaging:   Independant Imaging Review: Completed  CT-HEAD W/O   Final Result         1.  No acute intracranial abnormality is identified, there are nonspecific white matter changes, commonly associated with small vessel ischemic disease.  Associated mild cerebral atrophy is noted.   2.  Right maxillary sinus mucus retention cyst   3.  Atherosclerosis.      DX-CHEST-PORTABLE (1 VIEW)   Final Result         1.  No acute cardiopulmonary disease.   2.  Round calcification in the right upper quadrant, appearance suggests calcified gallstone.   3.  Atherosclerosis      CT-ABDOMEN WITH & W/O    (Results Pending)       Problem  Representation:    EENT: Grade I Mallalampati.     MSK/Pain: pt does not appear to be in pain.    CVS: RRR. No murmurs, no gallops, no rubs. Fluid status may be slightly on dry side as evidenced by tachycardia likely due to fever.    Pulms: CTA. No JVD.     GI: soft, nontender, nondistended. Pending CT A&P to r/o source.    Heme: on enoxaparin.     Endo: none    Derm/breast: old abrasion to R forehead unlikely source of sepsis.    ID: sepsis is likely with qSOFA 2 and SIRS criteria met.  abx was initally ceftriaxone in ED.  Flagyl added to ceftriaxone.  Since Bcx were positive for strep spp. without speciation. Enterococcus spp. Cannot be r/o. Flagyl switched for zosyn.  Pending ct abd & pelvis.  abx treatment is empiric 2/2 to no source identification.      Tubes: none  Lines: pIV (RUE)  Drains: none  Fluids: LR  Diet: Regular  DVT prophylaxis: enoxaparin  GI prophylaxis: none  Antibiotics: ceftriaxone and zosyn  Code Status: FULL  Disposition: Admitted to telemetry for empiric antbiotic treatment of sepsis.

## 2021-10-27 NOTE — CARE PLAN
Problem: Knowledge Deficit - Standard  Goal: Patient and family/care givers will demonstrate understanding of plan of care, disease process/condition, diagnostic tests and medications  Outcome: Progressing     Problem: Fluid Volume  Goal: Fluid volume balance will be maintained

## 2021-10-27 NOTE — PROGRESS NOTES
COVID-19 surge in effect    Bedside report received from day shift RN. Assumed care at 1900. Pt is A&Ox2. Pt is in bed. Pt denies pain at this time. Pt was updated on plan of care. Pt has call light, personal belongings, and bedside table within reach. Bed is in the lowest position and bed alarm is on. Telesitter at bedside. Will continue to monitor

## 2021-10-27 NOTE — DISCHARGE PLANNING
LSW received NOK results:        LSW made phone call to above number, number has been disconnected.

## 2021-10-27 NOTE — CARE PLAN
Problem: Knowledge Deficit - Standard  Goal: Patient and family/care givers will demonstrate understanding of plan of care, disease process/condition, diagnostic tests and medications  Outcome: Progressing  Note: Pt's whiteboard is updated, pt has been updated on POC, all questions have been answered at this time.      Problem: Fall Risk  Goal: Patient will remain free from falls  Outcome: Progressing  Note: Bed locked in lowest position. Bed alarm on. Treaded socks in use. Call light and belongings within reach. Pt educated to call for assistance. Pt verbalized understanding. Hourly rounding in place. Telesitter at bedside    The patient is Watcher - Medium risk of patient condition declining or worsening    Shift Goals  Clinical Goals: improve mentation  Patient Goals: rest  Family Goals: N/A    Progress made toward(s) clinical / shift goals:  rest    Patient is not progressing towards the following goals:

## 2021-10-27 NOTE — THERAPY
Physical Therapy   Initial Evaluation     Patient Name: Dottie Enamorado  Age:  66 y.o., Sex:  female  Medical Record #: [id number]  Today's Date: 10/27/2021          Assessment  Patient is 66 y.o. female admitted with ALOC and found to be septic.  Pt reports she recently moved to [place] to live with her aunt who can provide assist if needed. Pt completed all mobility with no assist or AD. Most deficits appears to be cognitive. Patient will not be actively followed for physical therapy services at this time, however may be seen if requested by physician for 1 more visit within 30 days to address any discharge or equipment needs    Plan    Recommend Physical Therapy for Evaluation only     DC Equipment Recommendations: None  Discharge Recommendations: Recommend home health for continued physical therapy services          10/27/21 0841   Prior Living Situation   Prior Services None   Housing / Facility 1 Story House   Steps Into Home 0   Steps In Home   (reports basement )   Equipment Owned None   Lives with - Patient's Self Care Capacity Other (Comments)   Comments pt lives with her aunt and her aunts grandchildren. Aunt is retured and able to assist   Prior Level of Functional Mobility   Bed Mobility Independent   Transfer Status Independent   Ambulation Independent   Distance Ambulation (Feet)   (community)   Assistive Devices Used None   Stairs Independent   Comments independent prior   Cognition    Level of Consciousness Alert   Comments appears to have mild cog deficits, unclear if this is baseline   Active ROM Lower Body    Active ROM Lower Body  WDL   Strength Lower Body   Lower Body Strength  WDL   Sensation Lower Body   Lower Extremity Sensation   WDL   Gait Analysis   Gait Level Of Assist Supervised   Assistive Device None   Distance (Feet) 150   # of Times Distance was Traveled 1   Deviation Decreased Base Of Support   # of Stairs Climbed 0   Weight Bearing Status no restrictions   Comments no LOB   Bed Mobility     Supine to Sit Supervised   Scooting Supervised   Comments left up in chair   Functional Mobility   Sit to Stand Supervised   Bed, Chair, Wheelchair Transfer Supervised   Toilet Transfers Supervised   Transfer Method Stand Step   Mobility in room and hallway   Anticipated Discharge Equipment and Recommendations   DC Equipment Recommendations None   Discharge Recommendations Recommend home health for continued physical therapy services

## 2021-10-27 NOTE — PROGRESS NOTES
Assumed care of patient at bedside report from NOC RN. Updated on POC. Patient currently A & O x 3 disoriented to time; on room air; up standby assist to commode; without complaints of acute pain. Patient displays guarding behavior when upper right abdominal quadrant is palpated but reports no pain at rest, patient has had multiple loose stool this AM.  Call light within reach. Whiteboard updated. Fall precautions in place. Bed locked and in lowest position. All questions answered. No other needs indicated at this time.

## 2021-10-28 ENCOUNTER — APPOINTMENT (OUTPATIENT)
Dept: RADIOLOGY | Facility: MEDICAL CENTER | Age: 66
DRG: 853 | End: 2021-10-28
Attending: STUDENT IN AN ORGANIZED HEALTH CARE EDUCATION/TRAINING PROGRAM
Payer: MEDICARE

## 2021-10-28 PROBLEM — R41.82 ALTERED MENTAL STATE: Status: ACTIVE | Noted: 2021-10-28

## 2021-10-28 PROBLEM — K81.9 CHOLECYSTITIS: Status: ACTIVE | Noted: 2021-10-28

## 2021-10-28 LAB
ANION GAP SERPL CALC-SCNC: 10 MMOL/L (ref 7–16)
BACTERIA BLD CULT: ABNORMAL
BACTERIA BLD CULT: ABNORMAL
BASOPHILS # BLD AUTO: 0.3 % (ref 0–1.8)
BASOPHILS # BLD: 0.05 K/UL (ref 0–0.12)
BUN SERPL-MCNC: 7 MG/DL (ref 8–22)
CALCIUM SERPL-MCNC: 8.1 MG/DL (ref 8.5–10.5)
CHLORIDE SERPL-SCNC: 102 MMOL/L (ref 96–112)
CO2 SERPL-SCNC: 24 MMOL/L (ref 20–33)
CREAT SERPL-MCNC: 0.58 MG/DL (ref 0.5–1.4)
EOSINOPHIL # BLD AUTO: 0.02 K/UL (ref 0–0.51)
EOSINOPHIL NFR BLD: 0.1 % (ref 0–6.9)
ERYTHROCYTE [DISTWIDTH] IN BLOOD BY AUTOMATED COUNT: 44.3 FL (ref 35.9–50)
ETEST SENSITIVITY ETEST: NORMAL
GLUCOSE SERPL-MCNC: 101 MG/DL (ref 65–99)
HCT VFR BLD AUTO: 34 % (ref 37–47)
HGB BLD-MCNC: 11.3 G/DL (ref 12–16)
IMM GRANULOCYTES # BLD AUTO: 0.1 K/UL (ref 0–0.11)
IMM GRANULOCYTES NFR BLD AUTO: 0.6 % (ref 0–0.9)
LYMPHOCYTES # BLD AUTO: 1.59 K/UL (ref 1–4.8)
LYMPHOCYTES NFR BLD: 10.2 % (ref 22–41)
MAGNESIUM SERPL-MCNC: 2 MG/DL (ref 1.5–2.5)
MCH RBC QN AUTO: 29.6 PG (ref 27–33)
MCHC RBC AUTO-ENTMCNC: 33.2 G/DL (ref 33.6–35)
MCV RBC AUTO: 89 FL (ref 81.4–97.8)
MONOCYTES # BLD AUTO: 0.46 K/UL (ref 0–0.85)
MONOCYTES NFR BLD AUTO: 3 % (ref 0–13.4)
NEUTROPHILS # BLD AUTO: 13.31 K/UL (ref 2–7.15)
NEUTROPHILS NFR BLD: 85.8 % (ref 44–72)
NRBC # BLD AUTO: 0 K/UL
NRBC BLD-RTO: 0 /100 WBC
PLATELET # BLD AUTO: 217 K/UL (ref 164–446)
PMV BLD AUTO: 10.3 FL (ref 9–12.9)
POTASSIUM SERPL-SCNC: 3.4 MMOL/L (ref 3.6–5.5)
RBC # BLD AUTO: 3.82 M/UL (ref 4.2–5.4)
SIGNIFICANT IND 70042: ABNORMAL
SITE SITE: ABNORMAL
SODIUM SERPL-SCNC: 136 MMOL/L (ref 135–145)
SOURCE SOURCE: ABNORMAL
WBC # BLD AUTO: 15.5 K/UL (ref 4.8–10.8)

## 2021-10-28 PROCEDURE — 700111 HCHG RX REV CODE 636 W/ 250 OVERRIDE (IP)

## 2021-10-28 PROCEDURE — A9537 TC99M MEBROFENIN: HCPCS

## 2021-10-28 PROCEDURE — 700111 HCHG RX REV CODE 636 W/ 250 OVERRIDE (IP): Performed by: STUDENT IN AN ORGANIZED HEALTH CARE EDUCATION/TRAINING PROGRAM

## 2021-10-28 PROCEDURE — 99232 SBSQ HOSP IP/OBS MODERATE 35: CPT | Mod: GC | Performed by: INTERNAL MEDICINE

## 2021-10-28 PROCEDURE — 700102 HCHG RX REV CODE 250 W/ 637 OVERRIDE(OP): Performed by: INTERNAL MEDICINE

## 2021-10-28 PROCEDURE — 85025 COMPLETE CBC W/AUTO DIFF WBC: CPT

## 2021-10-28 PROCEDURE — 700105 HCHG RX REV CODE 258: Performed by: STUDENT IN AN ORGANIZED HEALTH CARE EDUCATION/TRAINING PROGRAM

## 2021-10-28 PROCEDURE — 700102 HCHG RX REV CODE 250 W/ 637 OVERRIDE(OP)

## 2021-10-28 PROCEDURE — 770020 HCHG ROOM/CARE - TELE (206)

## 2021-10-28 PROCEDURE — A9270 NON-COVERED ITEM OR SERVICE: HCPCS

## 2021-10-28 PROCEDURE — 83735 ASSAY OF MAGNESIUM: CPT

## 2021-10-28 PROCEDURE — 700105 HCHG RX REV CODE 258

## 2021-10-28 PROCEDURE — A9270 NON-COVERED ITEM OR SERVICE: HCPCS | Performed by: INTERNAL MEDICINE

## 2021-10-28 PROCEDURE — 36415 COLL VENOUS BLD VENIPUNCTURE: CPT

## 2021-10-28 PROCEDURE — 80048 BASIC METABOLIC PNL TOTAL CA: CPT

## 2021-10-28 RX ORDER — MORPHINE SULFATE 4 MG/ML
INJECTION, SOLUTION INTRAMUSCULAR; INTRAVENOUS
Status: COMPLETED
Start: 2021-10-28 | End: 2021-10-28

## 2021-10-28 RX ORDER — CHOLECALCIFEROL (VITAMIN D3) 125 MCG
5 CAPSULE ORAL NIGHTLY
Status: DISCONTINUED | OUTPATIENT
Start: 2021-10-28 | End: 2021-11-01 | Stop reason: HOSPADM

## 2021-10-28 RX ORDER — POTASSIUM CHLORIDE 20 MEQ/1
40 TABLET, EXTENDED RELEASE ORAL DAILY
Status: DISCONTINUED | OUTPATIENT
Start: 2021-10-28 | End: 2021-11-01 | Stop reason: HOSPADM

## 2021-10-28 RX ORDER — MORPHINE SULFATE 4 MG/ML
2 INJECTION, SOLUTION INTRAMUSCULAR; INTRAVENOUS ONCE
Status: COMPLETED | OUTPATIENT
Start: 2021-10-28 | End: 2021-10-28

## 2021-10-28 RX ADMIN — SODIUM CHLORIDE, POTASSIUM CHLORIDE, SODIUM LACTATE AND CALCIUM CHLORIDE: 600; 310; 30; 20 INJECTION, SOLUTION INTRAVENOUS at 12:23

## 2021-10-28 RX ADMIN — Medication 5 MG: at 20:33

## 2021-10-28 RX ADMIN — PIPERACILLIN AND TAZOBACTAM 3.38 G: 3; .375 INJECTION, POWDER, LYOPHILIZED, FOR SOLUTION INTRAVENOUS; PARENTERAL at 12:23

## 2021-10-28 RX ADMIN — PIPERACILLIN AND TAZOBACTAM 3.38 G: 3; .375 INJECTION, POWDER, LYOPHILIZED, FOR SOLUTION INTRAVENOUS; PARENTERAL at 04:09

## 2021-10-28 RX ADMIN — POTASSIUM CHLORIDE 40 MEQ: 1500 TABLET, EXTENDED RELEASE ORAL at 12:24

## 2021-10-28 RX ADMIN — PIPERACILLIN AND TAZOBACTAM 3.38 G: 3; .375 INJECTION, POWDER, LYOPHILIZED, FOR SOLUTION INTRAVENOUS; PARENTERAL at 20:34

## 2021-10-28 RX ADMIN — ENOXAPARIN SODIUM 40 MG: 40 INJECTION SUBCUTANEOUS at 04:09

## 2021-10-28 RX ADMIN — MORPHINE SULFATE: 4 INJECTION INTRAVENOUS at 11:35

## 2021-10-28 ASSESSMENT — ENCOUNTER SYMPTOMS
CHILLS: 0
HEADACHES: 0
NAUSEA: 0
HEARTBURN: 0
BLURRED VISION: 0
DEPRESSION: 0
ABDOMINAL PAIN: 1
FEVER: 0
DIZZINESS: 0
DOUBLE VISION: 0
BRUISES/BLEEDS EASILY: 0
MYALGIAS: 0
PALPITATIONS: 0
VOMITING: 0
NECK PAIN: 0

## 2021-10-28 ASSESSMENT — FIBROSIS 4 INDEX: FIB4 SCORE: 1.9

## 2021-10-28 NOTE — DOCUMENTATION QUERY
[facility]                                                                       Query Response Note      PATIENT:               HAJA ANNE  ACCT #:                  [id number]  MRN:                     [id number]  :                      1955  ADMIT DATE:       10/25/2021 8:39 PM  DISCH DATE:          RESPONDING  PROVIDER #:        [id number]           QUERY TEXT:    Please clarify the clinical relevance for the clinical/diagnostic findings of lactic acid = 3.9.    NOTE:  If an appropriate response is not listed below, please respond with a new note.    Thank you,  Neisha Jansen RN, BSN  Clinical    Connect via Voalte      The patient's clinical indicators include:  10/25 lactic acid = 3.9  10/26 lactic acid = 2.7    H&P:  Dx Elevated lactic acid level    Treatment:  IV LR & NS infusion; IV Piperacillin-tazobactam, Ceftriaxone    Risk factors: Sepsis, hyperglycemia  Options provided:   -- Lactic acidosis is clinically relevant   -- Findings of no clinical significance   -- Unable to determine      Query created by: Neisha Jansen on 10/27/2021 9:00 AM    RESPONSE TEXT:    Lactic acidosis is clinically relevant          Electronically signed by:  MARIN CHAPARRO MD 10/27/2021 11:56 PM

## 2021-10-28 NOTE — PROGRESS NOTES
Monitor Summary:   Rhythm: Sinus Rhythm/Jaxon  Rate:   Measurement: .23/.05/.29  Ectopy: PVCs, PACs

## 2021-10-28 NOTE — NON-PROVIDER
Daily Progress Note:     Date of Service: 10/28/2021  Primary Team: anthony   Attending: Kevin Woods M.D.   Senior Resident: Dr. Huynh  Intern: Dr. Dc  Contact:  [phone]    Chief Complaint:   Altered Level of Consciousness     Subjective:  Dottie Enamorado is a 66 y.o. female who presented to the ED on 10/25 with altered mental status. She was transfered by EMS from urgent care where she presented disoriented and with stomach pain. Denied abdominal pain, cough, SOB, dysuria, oral temp 102.4. She was transferred for higher level of care due to disorientation (Oriented to person and situation), and fever, and was admitted for ALOC.     Interval History:  - No acute events overnight  -Continues on  Piperacillin-tazobactam (zosyn) TID IV (started 10/26)   -Blood Cx positive for beta hemolytic streptococcus group A    -WBC 15.5 but trending down from high 38.2 at admission   -Vitals wnl, afebrile  - Imaging:   -10/27 CT Abd/Pelvis: shows gallstones, distended gallbladder, wall thickening, and pericholecystic fluid consistent with acute cholecystitis   -10/27 10/27 Abdomen X-ray results showed: A centrally lucent gallstone is seen. Scattered gas in nondilated loops of small and large bowel. Degenerative changes present in the spine and pelvis.    -10/27 RUQ U/S: 1.  Fixed stone at the gallbladder neck with gallbladder distention and gallbladder filled with sludge with positive sonographic Noble's sign, however normal gallbladder wall thickness. Findings concerning but insufficient for diagnosis of  cholecystitis. Could be further evaluated with HIDA scan. 2.  Hepatomegaly and echogenic liver, compatible with fatty change versus fibrosis.3.  Atherosclerosis   -10/28 ??HIDA: Motion degraded exam due to patient noncooperation. No filling of the gallbladder seen, consistent with cystic duct obstruction.    Today,   Patient alert with orientation to person, not to place, situation, time. She reports right arm  pain at IV site, but no other pain. Denies abdominal pain. Reports no new symptoms. Denies headache, fever, chills, N/V/D, rash, SOB, cough, chest pain, dysuria.     Objective Data:   Physical Exam:   Vitals:   Temp:  [36.4 °C (97.6 °F)-37.5 °C (99.5 °F)] 36.7 °C (98 °F)  Pulse:  [68-85] 76  Resp:  [16-18] 16  BP: (124-154)/(69-80) 134/75  SpO2:  [94 %-98 %] 98 %    ??Neuropsych: alert, oriented to person. Not oriented to place, situation, not to time. No acute distress.   Head: Resolving bruising under left eye. Head normocephalic, atraumatic without tenderness, visible or palpable masses, depressions, or scarring.   Eyes: sclera is non-icteric, conjunctivae without exudates or hemorrhage.   Ears: external ear and ear canal are non-tender and without swelling. Canal clear without discharge. Hearing intact.   Nose: nares are patent bilaterally. No rhinnorhea.   Throat: oral muscoa pink and moist, with good dentition. Tongue normal in appearance without lesions and with good symmetrical movement. No buccal nodules or lesions. Pharynx is normal in apparent without tonsillar swelling or exudates.   Neck: Neck is supple without adenopathy. No JVD.  MSK/Derm: No joint swelling, eythema. No rash. Warm well perfused. Pulses palpable.   Cardiac: RRR. No m/g/r. Capillary refill <2 sec.  Respiratory:  CTAB. No accessory muscle use.   Abdominal: Soft. Mild diffuse tenderness to palpation RUQ, epigastric > LUQ,LLQ,RLQ. Positive coffey. Guarding RUQ. No rebound tenderness. No visible lesions or scars. Bowel sounds are present and normoactive. No masses, hepatomegaly or splenomegaly.      Labs:   Recent Labs     10/26/21  0015 10/27/21  0111 10/28/21  0112   WBC 34.0* 24.4* 15.5*   RBC 4.02* 3.91* 3.82*   HEMOGLOBIN 12.5 11.8* 11.3*   HEMATOCRIT 36.9* 35.6* 34.0*   MCV 91.8 91.0 89.0   MCH 31.1 30.2 29.6   RDW 47.1 46.0 44.3   PLATELETCT 276 219 217   MPV 9.2 9.7 10.3   NEUTSPOLYS 93.20* 92.60* 85.80*   LYMPHOCYTES 2.40* 3.70*  10.20*   MONOCYTES 2.60 1.90 3.00   EOSINOPHILS 0.00 0.00 0.10   BASOPHILS 0.30 0.20 0.30     Recent Labs     10/27/21  0111 10/27/21  0237 10/28/21  0112   SODIUM 134* 134* 136   POTASSIUM 3.4* 3.2* 3.4*   CHLORIDE 103 102 102   CO2 20 22 24   GLUCOSE 128* 146* 101*   BUN 18 17 7*     Imaging:   NM-BILIARY (HIDA) SCAN WITH CCK   Final Result      Motion degraded exam due to patient noncooperation. No filling of the gallbladder seen, consistent with cystic duct obstruction.      CT-ABDOMEN-PELVIS WITH   Final Result      1.  Gallstone in the gallbladder neck with distended gallbladder with gallbladder wall thickening and pericholecystic fluid consistent with acute cholecystitis.      2.  Inflammation in the pelvis which appears centered around the uterus and adnexa versus the sigmoid colon could indicate PID or perhaps represent inflammation tracking down from the gallbladder.      3.  Diverticulosis without definite evidence of diverticulitis.      US-RUQ   Final Result         1.  Fixed stone at the gallbladder neck with gallbladder distention and gallbladder filled with sludge with positive sonographic Noble's sign, however normal gallbladder wall thickness. Findings concerning but insufficient for diagnosis of    cholecystitis. Could be further evaluated with HIDA scan.   2.  Hepatomegaly and echogenic liver, compatible with fatty change versus fibrosis.   3.  Atherosclerosis      [id number] VIEW   Final Result         1. Nonobstructive bowel gas pattern.   2. Centrally lucent gallstone is present.      CT-HEAD W/O   Final Result         1.  No acute intracranial abnormality is identified, there are nonspecific white matter changes, commonly associated with small vessel ischemic disease.  Associated mild cerebral atrophy is noted.   2.  Right maxillary sinus mucus retention cyst   3.  Atherosclerosis.      DX-CHEST-PORTABLE (1 VIEW)   Final Result         1.  No acute cardiopulmonary disease.   2.  Round  calcification in the right upper quadrant, appearance suggests calcified gallstone.   3.  Atherosclerosis          Problem Representation:   Dottie Enamorado is a 66 y.o. female who presented to the ED on 10/25 with altered mental status (Oriented to person and situation), fever, and abdominal pain. She was admitted for sepsis and to investigate etiology of AMS.    #sepsis, unknown etiology  +SIRS and 2/3 Qsofa. High ANC, presumed bacterial sepsis, inflammatory, drug hypersensitivity. Neoplastic causes not apparent.   Vitals wnl: afebrile, /69. Labs: WBC elevated 15.5, but trending down from high 38.2 at admission. Lactic acid 1.3 wnl  Possible Source:   Pulm: Negative CXR to suggest pulmonary source.   GI: No diarrhea to suggest C. Dif. 10/27 CT abd/pelvis findings consistent with acute cholecystitis.   : Possible asymptomatic pyuria, less likely source of  sepsis. Nitrite negative would be staph or enterococcus.   Derm: Blood Cx - gram positive cocci- possible strep. No skin/oral lesions on exam  Plan:  - Continue Piperacillin-tazobactam (zosyn) TID IV   - Reevaluate surgery for possible cholecystectomy after 5 days Abx  - Continue Skin checks to investigate possible derm source   - Urine Cx: results pending     #decompensated dementia w/ encephalopathy, likely systemic sepsis  AOx1 (person only). 10/25 CT Head no acute intracranial abnormality. No meningismus.   Plan:  - Continue  mL/hr continuous  - Treat sepsis    #cholecystitis v diverticulitis  Occult stool positive 10/27. Positive coffey sign. 0/27 CT abd/pelvis findings consistent with acute cholecystitis.  10/28 HIDA findings consistent with cystic duct obstruction.   Plan:  - Check with radiology regarding 10/27 CT Abd/Pelvis - acute cholecystitis, and possible PID/inflammation from gallbladder (uterus/adnexa/sigmoid colon), diverticulosis without evidence diverticulitis  - Consult surgery after 5 days Abx and continued improvement of  sepsis    #hypokalemia, likely 2/2 insufficient intake, inflammatory state  KCl 40mEq daily   Magnesium 2.0 wnl  Potassium 3.4 today  Likely due to insufficient intake  Plan:   - Continue K+ supplementation

## 2021-10-28 NOTE — CARE PLAN
The patient is Watcher - Medium risk of patient condition declining or worsening    Shift Goals  Clinical Goals: improve mentation  Patient Goals: comfort  Family Goals: n/a    Progress made toward(s) clinical / shift goals:    Problem: Pain - Standard  Goal: Alleviation of pain or a reduction in pain to the patient’s comfort goal  Outcome: Progressing     Problem: Fall Risk  Goal: Patient will remain free from falls  Outcome: Progressing     Problem: Physical Regulation  Goal: Signs and symptoms of infection will decrease  Outcome: Progressing

## 2021-10-28 NOTE — THERAPY
Occupational Therapy   Initial Evaluation     Patient Name: Dottie Enamorado  Age:  66 y.o., Sex:  female  Medical Record #: [id number]  Today's Date: 10/28/2021     Precautions  Precautions: Fall Risk  Comments: hx of dementia    Assessment  Patient is 66 y.o. female admitted for AMS and abdominal pain, found to have sepsis and decompensated dementia with encephalopathy. PMHx of dementia. Completed ADLs/txfs with SPV and functional ambulation w/o AD and SPV; min v/cs for safety. Reports lives with aunt who can assist 24/7 PRN. Patient will not be actively followed for occupational therapy services at this time, however may be seen if requested by physician for 1 more visit within 30 days to address any discharge or equipment needs.     Plan    Recommend Occupational Therapy d/c needs only     DC Equipment Recommendations: Tub / Shower Seat  Discharge Recommendations: Recommend home health for continued occupational therapy services     Objective     10/27/21 0825   Prior Living Situation   Prior Services Home-Independent   Housing / Facility 1 Story House   Steps Into Home 2   Bathroom Set up Bathtub / Shower Combination;Grab Bars   Equipment Owned Grab Bar(s) In Tub / Shower   Lives with - Patient's Self Care Capacity Other (Comments)   Comments Reports lives with aunt who can assist 24/7 PRN   Prior Level of ADL Function   Self Feeding Independent   Grooming / Hygiene Independent   Bathing Independent   Dressing Independent   Toileting Independent   Prior Level of IADL Function   Medication Management Independent   Laundry Requires Assist   Kitchen Mobility Independent   Finances Independent   Home Management Requires Assist   Shopping Requires Assist   Prior Level Of Mobility Independent Without Device in Home   Precautions   Precautions Fall Risk   Comments hx of dementia   Vitals   O2 Delivery Device None - Room Air   Pain 0 - 10 Group   Location Abdomen   Location Orientation Right   Therapist Pain Assessment  During Activity;Post Activity;Nurse Notified  (not quantified)   Cognition    Cognition / Consciousness WDL   Level of Consciousness Alert   Comments pleasant and cooperative; mild cognitive deficits, likely baseline d/t hx of dementia   Passive ROM Upper Body   Passive ROM Upper Body WDL   Active ROM Upper Body   Active ROM Upper Body  WDL   Strength Upper Body   Comments functional for ADLs but reports weaker than baseline   Balance Assessment   Sitting Balance (Static) Good   Sitting Balance (Dynamic) Fair +   Standing Balance (Static) Fair   Standing Balance (Dynamic) Fair   Weight Shift Sitting Good   Weight Shift Standing Fair   Comments w/o AD   Bed Mobility    Supine to Sit Supervised   Scooting Supervised   Comments HOB elevated; left in chair   ADL Assessment   Grooming Supervision;Standing  (washing hands)   Lower Body Dressing Supervision   Toileting Supervision   How much help from another person does the patient currently need...   Putting on and taking off regular lower body clothing? 4   Bathing (including washing, rinsing, and drying)? 3   Toileting, which includes using a toilet, bedpan, or urinal? 3   Putting on and taking off regular upper body clothing? 4   Taking care of personal grooming such as brushing teeth? 3   Eating meals? 4   6 Clicks Daily Activity Score 21   Functional Mobility   Sit to Stand Supervised   Bed, Chair, Wheelchair Transfer Supervised   Toilet Transfers Supervised   Transfer Method Stand Step   Mobility w/o AD in room   Edema / Skin Assessment   Edema / Skin  Not Assessed   Activity Tolerance   Comments no c/o fatigue only diarrhea   Education Group   Education Provided Role of Occupational Therapist;Home Safety;Pathology of bedrest   Role of Occupational Therapist Patient Response Patient;Acceptance;Explanation;Verbal Demonstration;Reinforcement Needed   Home Safety Patient Response Patient;Acceptance;Explanation;Verbal Demonstration;Reinforcement Needed   Pathology of  Bedrest Patient Response Patient;Acceptance;Explanation;Verbal Demonstration;Reinforcement Needed

## 2021-10-28 NOTE — PROGRESS NOTES
Bedside report received from day RN, pt care assumed, assessment completed. Pt is Alert, sinus rhythm on the monitor. Updated on POC, questions answered. Bed in lowest, locked position, treaded socks on, call light and belongings within reach. Fall precautions in place.

## 2021-10-28 NOTE — DISCHARGE PLANNING
Anticipated discharge disposition: home with family    Action: met with pt at bedside to see if pt knows her aunts phone number, pt does not remember the phone number. Pt stated she does not have cell phone, checked pts belonging and no cell phone. Pt stated she also has a sister named Alisia but could not remember her phone number either.     Addendum @0617  Instructed by leadership to look up pts aunt in epic in attempt to obtain contact info. [place] with same address as pts face sheet is .     Barriers to discharge: contacting family    Plan: care coordination will follow up with [facility] leadership.

## 2021-10-28 NOTE — PROGRESS NOTES
Monitor Summary  Rhythm: SB/SR with 1st degree HB  Rate: 52-77  Ectopy: PVC, PAC  0.23 / 0.05 / 0.39

## 2021-10-29 LAB
ANION GAP SERPL CALC-SCNC: 11 MMOL/L (ref 7–16)
BACTERIA UR CULT: NORMAL
BASOPHILS # BLD AUTO: 0.4 % (ref 0–1.8)
BASOPHILS # BLD AUTO: 0.5 % (ref 0–1.8)
BASOPHILS # BLD: 0.04 K/UL (ref 0–0.12)
BASOPHILS # BLD: 0.04 K/UL (ref 0–0.12)
BUN SERPL-MCNC: 4 MG/DL (ref 8–22)
C DIFF DNA SPEC QL NAA+PROBE: NEGATIVE
C DIFF TOX GENS STL QL NAA+PROBE: NEGATIVE
CALCIUM SERPL-MCNC: 8.3 MG/DL (ref 8.5–10.5)
CHLORIDE SERPL-SCNC: 105 MMOL/L (ref 96–112)
CO2 SERPL-SCNC: 23 MMOL/L (ref 20–33)
CREAT SERPL-MCNC: 0.54 MG/DL (ref 0.5–1.4)
EOSINOPHIL # BLD AUTO: 0.02 K/UL (ref 0–0.51)
EOSINOPHIL # BLD AUTO: 0.04 K/UL (ref 0–0.51)
EOSINOPHIL NFR BLD: 0.2 % (ref 0–6.9)
EOSINOPHIL NFR BLD: 0.4 % (ref 0–6.9)
ERYTHROCYTE [DISTWIDTH] IN BLOOD BY AUTOMATED COUNT: 44.7 FL (ref 35.9–50)
ERYTHROCYTE [DISTWIDTH] IN BLOOD BY AUTOMATED COUNT: 44.8 FL (ref 35.9–50)
GLUCOSE SERPL-MCNC: 74 MG/DL (ref 65–99)
HCT VFR BLD AUTO: 31.5 % (ref 37–47)
HCT VFR BLD AUTO: 36.2 % (ref 37–47)
HGB BLD-MCNC: 10.7 G/DL (ref 12–16)
HGB BLD-MCNC: 12.1 G/DL (ref 12–16)
IMM GRANULOCYTES # BLD AUTO: 0.06 K/UL (ref 0–0.11)
IMM GRANULOCYTES # BLD AUTO: 0.1 K/UL (ref 0–0.11)
IMM GRANULOCYTES NFR BLD AUTO: 0.7 % (ref 0–0.9)
IMM GRANULOCYTES NFR BLD AUTO: 1.2 % (ref 0–0.9)
LYMPHOCYTES # BLD AUTO: 1.37 K/UL (ref 1–4.8)
LYMPHOCYTES # BLD AUTO: 1.7 K/UL (ref 1–4.8)
LYMPHOCYTES NFR BLD: 16 % (ref 22–41)
LYMPHOCYTES NFR BLD: 19 % (ref 22–41)
MAGNESIUM SERPL-MCNC: 1.9 MG/DL (ref 1.5–2.5)
MCH RBC QN AUTO: 29.8 PG (ref 27–33)
MCH RBC QN AUTO: 30.4 PG (ref 27–33)
MCHC RBC AUTO-ENTMCNC: 33.4 G/DL (ref 33.6–35)
MCHC RBC AUTO-ENTMCNC: 34 G/DL (ref 33.6–35)
MCV RBC AUTO: 89.2 FL (ref 81.4–97.8)
MCV RBC AUTO: 89.5 FL (ref 81.4–97.8)
MONOCYTES # BLD AUTO: 0.41 K/UL (ref 0–0.85)
MONOCYTES # BLD AUTO: 0.59 K/UL (ref 0–0.85)
MONOCYTES NFR BLD AUTO: 4.8 % (ref 0–13.4)
MONOCYTES NFR BLD AUTO: 6.6 % (ref 0–13.4)
NEUTROPHILS # BLD AUTO: 6.51 K/UL (ref 2–7.15)
NEUTROPHILS # BLD AUTO: 6.63 K/UL (ref 2–7.15)
NEUTROPHILS NFR BLD: 72.9 % (ref 44–72)
NEUTROPHILS NFR BLD: 77.3 % (ref 44–72)
NRBC # BLD AUTO: 0 K/UL
NRBC # BLD AUTO: 0 K/UL
NRBC BLD-RTO: 0 /100 WBC
NRBC BLD-RTO: 0 /100 WBC
PLATELET # BLD AUTO: 218 K/UL (ref 164–446)
PLATELET # BLD AUTO: 244 K/UL (ref 164–446)
PMV BLD AUTO: 10.1 FL (ref 9–12.9)
PMV BLD AUTO: 9.7 FL (ref 9–12.9)
POTASSIUM SERPL-SCNC: 3.6 MMOL/L (ref 3.6–5.5)
RBC # BLD AUTO: 3.52 M/UL (ref 4.2–5.4)
RBC # BLD AUTO: 4.06 M/UL (ref 4.2–5.4)
SIGNIFICANT IND 70042: NORMAL
SITE SITE: NORMAL
SODIUM SERPL-SCNC: 139 MMOL/L (ref 135–145)
SOURCE SOURCE: NORMAL
WBC # BLD AUTO: 8.6 K/UL (ref 4.8–10.8)
WBC # BLD AUTO: 8.9 K/UL (ref 4.8–10.8)

## 2021-10-29 PROCEDURE — 85025 COMPLETE CBC W/AUTO DIFF WBC: CPT

## 2021-10-29 PROCEDURE — 770001 HCHG ROOM/CARE - MED/SURG/GYN PRIV*

## 2021-10-29 PROCEDURE — A9270 NON-COVERED ITEM OR SERVICE: HCPCS | Performed by: STUDENT IN AN ORGANIZED HEALTH CARE EDUCATION/TRAINING PROGRAM

## 2021-10-29 PROCEDURE — A9270 NON-COVERED ITEM OR SERVICE: HCPCS | Performed by: INTERNAL MEDICINE

## 2021-10-29 PROCEDURE — 36415 COLL VENOUS BLD VENIPUNCTURE: CPT

## 2021-10-29 PROCEDURE — 83735 ASSAY OF MAGNESIUM: CPT

## 2021-10-29 PROCEDURE — 99232 SBSQ HOSP IP/OBS MODERATE 35: CPT | Mod: GC | Performed by: INTERNAL MEDICINE

## 2021-10-29 PROCEDURE — 700102 HCHG RX REV CODE 250 W/ 637 OVERRIDE(OP): Performed by: INTERNAL MEDICINE

## 2021-10-29 PROCEDURE — 700102 HCHG RX REV CODE 250 W/ 637 OVERRIDE(OP): Performed by: STUDENT IN AN ORGANIZED HEALTH CARE EDUCATION/TRAINING PROGRAM

## 2021-10-29 PROCEDURE — 80048 BASIC METABOLIC PNL TOTAL CA: CPT

## 2021-10-29 PROCEDURE — 700111 HCHG RX REV CODE 636 W/ 250 OVERRIDE (IP)

## 2021-10-29 PROCEDURE — 700111 HCHG RX REV CODE 636 W/ 250 OVERRIDE (IP): Performed by: STUDENT IN AN ORGANIZED HEALTH CARE EDUCATION/TRAINING PROGRAM

## 2021-10-29 PROCEDURE — 700102 HCHG RX REV CODE 250 W/ 637 OVERRIDE(OP)

## 2021-10-29 PROCEDURE — A9270 NON-COVERED ITEM OR SERVICE: HCPCS

## 2021-10-29 PROCEDURE — 700105 HCHG RX REV CODE 258

## 2021-10-29 PROCEDURE — 700105 HCHG RX REV CODE 258: Performed by: STUDENT IN AN ORGANIZED HEALTH CARE EDUCATION/TRAINING PROGRAM

## 2021-10-29 RX ORDER — HALOPERIDOL 1 MG/1
1 TABLET ORAL ONCE
Status: COMPLETED | OUTPATIENT
Start: 2021-10-29 | End: 2021-10-29

## 2021-10-29 RX ORDER — QUETIAPINE FUMARATE 25 MG/1
25 TABLET, FILM COATED ORAL NIGHTLY
Status: DISCONTINUED | OUTPATIENT
Start: 2021-10-29 | End: 2021-10-29

## 2021-10-29 RX ORDER — OMEPRAZOLE 20 MG/1
20 CAPSULE, DELAYED RELEASE ORAL DAILY
Status: DISCONTINUED | OUTPATIENT
Start: 2021-10-29 | End: 2021-11-01 | Stop reason: HOSPADM

## 2021-10-29 RX ORDER — QUETIAPINE FUMARATE 25 MG/1
25 TABLET, FILM COATED ORAL 3 TIMES DAILY
Status: DISCONTINUED | OUTPATIENT
Start: 2021-10-29 | End: 2021-10-30

## 2021-10-29 RX ADMIN — QUETIAPINE FUMARATE 25 MG: 25 TABLET ORAL at 19:55

## 2021-10-29 RX ADMIN — ENOXAPARIN SODIUM 40 MG: 40 INJECTION SUBCUTANEOUS at 04:24

## 2021-10-29 RX ADMIN — PIPERACILLIN AND TAZOBACTAM 3.38 G: 3; .375 INJECTION, POWDER, LYOPHILIZED, FOR SOLUTION INTRAVENOUS; PARENTERAL at 19:54

## 2021-10-29 RX ADMIN — OMEPRAZOLE 20 MG: 20 CAPSULE, DELAYED RELEASE ORAL at 11:22

## 2021-10-29 RX ADMIN — POTASSIUM CHLORIDE 40 MEQ: 1500 TABLET, EXTENDED RELEASE ORAL at 04:23

## 2021-10-29 RX ADMIN — Medication 5 MG: at 19:55

## 2021-10-29 RX ADMIN — PIPERACILLIN AND TAZOBACTAM 3.38 G: 3; .375 INJECTION, POWDER, LYOPHILIZED, FOR SOLUTION INTRAVENOUS; PARENTERAL at 14:52

## 2021-10-29 RX ADMIN — QUETIAPINE FUMARATE 25 MG: 25 TABLET ORAL at 17:29

## 2021-10-29 RX ADMIN — SODIUM CHLORIDE, POTASSIUM CHLORIDE, SODIUM LACTATE AND CALCIUM CHLORIDE: 600; 310; 30; 20 INJECTION, SOLUTION INTRAVENOUS at 02:59

## 2021-10-29 RX ADMIN — HALOPERIDOL 1 MG: 1 TABLET ORAL at 23:20

## 2021-10-29 RX ADMIN — PIPERACILLIN AND TAZOBACTAM 3.38 G: 3; .375 INJECTION, POWDER, LYOPHILIZED, FOR SOLUTION INTRAVENOUS; PARENTERAL at 04:25

## 2021-10-29 ASSESSMENT — PAIN DESCRIPTION - PAIN TYPE
TYPE: ACUTE PAIN
TYPE: ACUTE PAIN

## 2021-10-29 ASSESSMENT — ENCOUNTER SYMPTOMS
BRUISES/BLEEDS EASILY: 0
DOUBLE VISION: 0
BLURRED VISION: 0
DEPRESSION: 0
MYALGIAS: 0
VOMITING: 0
NAUSEA: 0
NECK PAIN: 0
ABDOMINAL PAIN: 1
PALPITATIONS: 0
HEARTBURN: 0
CHILLS: 0
HEADACHES: 0
FEVER: 0
DIZZINESS: 0

## 2021-10-29 ASSESSMENT — FIBROSIS 4 INDEX: FIB4 SCORE: 1.69

## 2021-10-29 NOTE — CARE PLAN
The patient is Watcher - Medium risk of patient condition declining or worsening    Shift Goals  Clinical Goals: mentation/sleep  Patient Goals: rest/comfort  Family Goals: n/a      Problem: Knowledge Deficit - Standard  Goal: Patient and family/care givers will demonstrate understanding of plan of care, disease process/condition, diagnostic tests and medications  Outcome: Progressing     Problem: Hemodynamics  Goal: Patient's hemodynamics, fluid balance and neurologic status will be stable or improve  Outcome: Progressing     Problem: Fluid Volume  Goal: Fluid volume balance will be maintained  Outcome: Progressing

## 2021-10-29 NOTE — ASSESSMENT & PLAN NOTE
This is Sepsis Present on admission  SIRS criteria identified on my evaluation include:Tachycarida Leukocytosis, with WBC greater than 12,000  Source is Gallbladder.   Sepsis protocol initiated  Fluid resuscitation ordered per protocol  IV antibiotics as appropriate for source of sepsis  While organ dysfunction may be noted elsewhere in this problem list or in the chart, degree of organ dysfunction does not meet CMS criteria for severe sepsis  - source removed laparoscopically on 10/31.

## 2021-10-29 NOTE — CARE PLAN
The patient is Watcher - Medium risk of patient condition declining or worsening    Shift Goals  Clinical Goals: mentation/sleep  Patient Goals: rest/comfort  Family Goals: n/a    Progress made toward(s) clinical / shift goals:    Problem: Fall Risk  Goal: Patient will remain free from falls  Outcome: Progressing     Problem: Physical Regulation  Goal: Signs and symptoms of infection will decrease  Outcome: Progressing

## 2021-10-29 NOTE — PROGRESS NOTES
Report received from Candace BLANCHARD. Patient is Alert to self and is very non-compliant with instruction. All fall precautions are in place, tele monitoring on, and call light is in reach. Lap belt and tele-sitter are in room with patient and she is able to demonstrate removal of lap belt.

## 2021-10-29 NOTE — ASSESSMENT & PLAN NOTE
- pt was alert and oriented to person only   -Intermittently confusion suggested with resolving/agitation.  -Delirium suspected. 50 mg Seroquel at bedtime ordered.

## 2021-10-29 NOTE — PROGRESS NOTES
"Daily Progress Note:     Date of Service: 10/29/2021  Primary Team: [facility] IM Gray Team   Attending: Kevin Woods M.D.   Senior Resident: Dr. Huynh  Intern: Dr. Dc  Contact:  [phone]    Chief Complaint:  Abd pain    ID:  Ms. Enamorado is a 67 yo F with unknown PMHx 2/2 to [facility] admitted to floor as a transfer from urgent care where she presented yesterday (10/25/2021) for fever, chills.    Subjective& interval history  No acute overnight events.   Pt appearance has clinically improved after antibiotic therapy and states that \"she feels better.\"   She denies any abdominal pain this morning  Afebrile  White blood count continues to improve with continuation of the antibiotic (Zosyn).  Per nursing report, the patient had a limited time of sleep overnight and was getting intermittently irritable/agitated.  CT scan was also obtained showed distended gallbladder and pericholecystic fluid suggestive of cholecystitis.  The patient remained alert however she is only only oriented to self.    .    Consultants/Specialty:  Surgery Consult    Review of Systems:    Review of Systems   Constitutional: Negative for chills and fever.   HENT: Negative for hearing loss and tinnitus.    Eyes: Negative for blurred vision and double vision.   Cardiovascular: Negative for chest pain and palpitations.   Gastrointestinal: Positive for abdominal pain (RUQ). Negative for heartburn, nausea and vomiting.   Genitourinary: Negative for dysuria and urgency.   Musculoskeletal: Negative for myalgias and neck pain.   Skin: Negative for itching and rash.   Neurological: Negative for dizziness and headaches.   Endo/Heme/Allergies: Negative for environmental allergies. Does not bruise/bleed easily.   Psychiatric/Behavioral: Negative for depression and suicidal ideas.       Objective Data:   Physical Exam:   Vitals:   Temp:  [36.4 °C (97.6 °F)-36.7 °C (98.1 °F)] 36.4 °C (97.6 °F)  Pulse:  [68-98] 77  Resp:  [16-18] 16  BP: (116-143)/(56-80) " 143/80  SpO2:  [91 %-96 %] 96 %    Physical Exam  Constitutional:       General: She is not in acute distress.     Appearance: Normal appearance. She is not ill-appearing or toxic-appearing.   HENT:      Head: Normocephalic and atraumatic.      Right Ear: Tympanic membrane normal. There is no impacted cerumen.      Left Ear: Tympanic membrane normal. There is no impacted cerumen.      Nose: Nose normal. No congestion or rhinorrhea.      Mouth/Throat:      Mouth: Mucous membranes are moist.      Pharynx: Oropharynx is clear. No oropharyngeal exudate or posterior oropharyngeal erythema.   Eyes:      Extraocular Movements: Extraocular movements intact.      Conjunctiva/sclera: Conjunctivae normal.      Pupils: Pupils are equal, round, and reactive to light.   Cardiovascular:      Rate and Rhythm: Normal rate and regular rhythm.      Pulses: Normal pulses.      Heart sounds: Normal heart sounds. No murmur heard.   No friction rub. No gallop.    Pulmonary:      Effort: Pulmonary effort is normal. No respiratory distress.      Breath sounds: Normal breath sounds. No stridor. No wheezing, rhonchi or rales.   Abdominal:      General: Abdomen is flat.      Palpations: There is no mass.      Tenderness: There is abdominal tenderness (RUQ with Noble's sign). There is guarding.   Musculoskeletal:         General: No swelling or tenderness. Normal range of motion.      Cervical back: Normal range of motion. No rigidity.   Skin:     General: Skin is warm and dry.      Capillary Refill: Capillary refill takes less than 2 seconds.      Coloration: Skin is not jaundiced.   Neurological:      Mental Status: She is alert and oriented to person, place, and time.      Cranial Nerves: No cranial nerve deficit.      Sensory: No sensory deficit.           Labs:   Recent Results (from the past 24 hour(s))   Basic Metabolic Panel    Collection Time: 10/29/21  1:46 AM   Result Value Ref Range    Sodium 139 135 - 145 mmol/L    Potassium 3.6  3.6 - 5.5 mmol/L    Chloride 105 96 - 112 mmol/L    Co2 23 20 - 33 mmol/L    Glucose 74 65 - 99 mg/dL    Bun 4 (L) 8 - 22 mg/dL    Creatinine 0.54 0.50 - 1.40 mg/dL    Calcium 8.3 (L) 8.5 - 10.5 mg/dL    Anion Gap 11.0 7.0 - 16.0   MAGNESIUM    Collection Time: 10/29/21  1:46 AM   Result Value Ref Range    Magnesium 1.9 1.5 - 2.5 mg/dL   CBC WITH DIFFERENTIAL    Collection Time: 10/29/21  1:46 AM   Result Value Ref Range    WBC 8.9 4.8 - 10.8 K/uL    RBC 3.52 (L) 4.20 - 5.40 M/uL    Hemoglobin 10.7 (L) 12.0 - 16.0 g/dL    Hematocrit 31.5 (L) 37.0 - 47.0 %    MCV 89.5 81.4 - 97.8 fL    MCH 30.4 27.0 - 33.0 pg    MCHC 34.0 33.6 - 35.0 g/dL    RDW 44.7 35.9 - 50.0 fL    Platelet Count 218 164 - 446 K/uL    MPV 10.1 9.0 - 12.9 fL    Neutrophils-Polys 72.90 (H) 44.00 - 72.00 %    Lymphocytes 19.00 (L) 22.00 - 41.00 %    Monocytes 6.60 0.00 - 13.40 %    Eosinophils 0.40 0.00 - 6.90 %    Basophils 0.40 0.00 - 1.80 %    Immature Granulocytes 0.70 0.00 - 0.90 %    Nucleated RBC 0.00 /100 WBC    Neutrophils (Absolute) 6.51 2.00 - 7.15 K/uL    Lymphs (Absolute) 1.70 1.00 - 4.80 K/uL    Monos (Absolute) 0.59 0.00 - 0.85 K/uL    Eos (Absolute) 0.04 0.00 - 0.51 K/uL    Baso (Absolute) 0.04 0.00 - 0.12 K/uL    Immature Granulocytes (abs) 0.06 0.00 - 0.11 K/uL    NRBC (Absolute) 0.00 K/uL   ESTIMATED GFR    Collection Time: 10/29/21  1:46 AM   Result Value Ref Range    GFR If African American >60 >60 mL/min/1.73 m 2    GFR If Non African American >60 >60 mL/min/1.73 m 2   CBC WITH DIFFERENTIAL    Collection Time: 10/29/21 10:03 AM   Result Value Ref Range    WBC 8.6 4.8 - 10.8 K/uL    RBC 4.06 (L) 4.20 - 5.40 M/uL    Hemoglobin 12.1 12.0 - 16.0 g/dL    Hematocrit 36.2 (L) 37.0 - 47.0 %    MCV 89.2 81.4 - 97.8 fL    MCH 29.8 27.0 - 33.0 pg    MCHC 33.4 (L) 33.6 - 35.0 g/dL    RDW 44.8 35.9 - 50.0 fL    Platelet Count 244 164 - 446 K/uL    MPV 9.7 9.0 - 12.9 fL    Neutrophils-Polys 77.30 (H) 44.00 - 72.00 %    Lymphocytes 16.00 (L)  22.00 - 41.00 %    Monocytes 4.80 0.00 - 13.40 %    Eosinophils 0.20 0.00 - 6.90 %    Basophils 0.50 0.00 - 1.80 %    Immature Granulocytes 1.20 (H) 0.00 - 0.90 %    Nucleated RBC 0.00 /100 WBC    Neutrophils (Absolute) 6.63 2.00 - 7.15 K/uL    Lymphs (Absolute) 1.37 1.00 - 4.80 K/uL    Monos (Absolute) 0.41 0.00 - 0.85 K/uL    Eos (Absolute) 0.02 0.00 - 0.51 K/uL    Baso (Absolute) 0.04 0.00 - 0.12 K/uL    Immature Granulocytes (abs) 0.10 0.00 - 0.11 K/uL    NRBC (Absolute) 0.00 K/uL       Imaging:   Independant Imaging Review: Completed  NM-BILIARY (HIDA) SCAN WITH CCK   Final Result      Motion degraded exam due to patient noncooperation. No filling of the gallbladder seen, consistent with cystic duct obstruction.      CT-ABDOMEN-PELVIS WITH   Final Result      1.  Gallstone in the gallbladder neck with distended gallbladder with gallbladder wall thickening and pericholecystic fluid consistent with acute cholecystitis.      2.  Inflammation in the pelvis which appears centered around the uterus and adnexa versus the sigmoid colon could indicate PID or perhaps represent inflammation tracking down from the gallbladder.      3.  Diverticulosis without definite evidence of diverticulitis.      US-RUQ   Final Result         1.  Fixed stone at the gallbladder neck with gallbladder distention and gallbladder filled with sludge with positive sonographic Noble's sign, however normal gallbladder wall thickness. Findings concerning but insufficient for diagnosis of    cholecystitis. Could be further evaluated with HIDA scan.   2.  Hepatomegaly and echogenic liver, compatible with fatty change versus fibrosis.   3.  Atherosclerosis      [id number] VIEW   Final Result         1. Nonobstructive bowel gas pattern.   2. Centrally lucent gallstone is present.      CT-HEAD W/O   Final Result         1.  No acute intracranial abnormality is identified, there are nonspecific white matter changes, commonly associated with small  "vessel ischemic disease.  Associated mild cerebral atrophy is noted.   2.  Right maxillary sinus mucus retention cyst   3.  Atherosclerosis.      DX-CHEST-PORTABLE (1 VIEW)   Final Result         1.  No acute cardiopulmonary disease.   2.  Round calcification in the right upper quadrant, appearance suggests calcified gallstone.   3.  Atherosclerosis          Problem Representation:        Altered mental state  Assessment & Plan  - pt was alert and oriented to person only   -Intermittently confusion suggested with resolving/agitation, will try low-dose Seroquel at nightime.    Cholecystitis  Assessment & Plan  -  Acute calculous cholecystitis  - galls stones visualized in 3 imaging modalities  - US showed: Fixed stone at the gallbladder neck with gallbladder distention and gallbladder filled with sludge with positive sonographic Noble's sign, however normal gallbladder wall thickness.   HIDA scan>No filling of the gallbladder consistent with cystic duct obstruction  -pt is on Zosyn. LR.  CT scan abdomen was also obtained showed distended gallbladder and pericholecystic fluid suggestive of cholecystitis.  -Surgery \"Dr. Ohara\" was consulted.    Sepsis (HCC)- (present on admission)  Assessment & Plan  This is Sepsis Present on admission  SIRS criteria identified on my evaluation include:Tachycarida Leukocytosis, with WBC greater than 12,000  Source is Gallbladder  Sepsis protocol initiated  Fluid resuscitation ordered per protocol  IV antibiotics as appropriate for source of sepsis  While organ dysfunction may be noted elsewhere in this problem list or in the chart, degree of organ dysfunction does not meet CMS criteria for severe sepsis        Tubes: none  Lines: pIV (RUE)  Drains: none  Fluids: LR  Diet: Regular  DVT prophylaxis: enoxaparin  GI prophylaxis: none  Antibiotics: Zosyn  Code Status: FULL  Disposition: ON Zosyn  "

## 2021-10-29 NOTE — PROGRESS NOTES
"Daily Progress Note:     Date of Service: 10/28/2021  Primary Team: [facility] Gray Team   Attending: Kevin Woods M.D.   Senior Resident: Dr. Huynh  Intern: Dr. Dc  Contact:  [phone]    Chief Complaint:  Abd pain    ID:  Ms. Enamorado is a 67 yo F with unknown PMHx 2/2 to [facility] admitted to floor as a transfer from urgent care where she presented yesterday (10/25/2021) for fever, chills.    Subjective:  No acute overnight events. Pt appearance has clinically improved after antibiotic therapy and states that \"she feels better.\"     Interval History:  HIDA scan showed Motion degraded exam due to patient noncooperation. No filling of the gallbladder seen, consistent with cystic duct obstruction.  WBC 15.4 but improved from yesterday. K+ 3.5 repleted with Kdoe.    Consultants/Specialty:  Pending Surgery Consult    Review of Systems:    Review of Systems   Constitutional: Negative for chills and fever.   HENT: Negative for hearing loss and tinnitus.    Eyes: Negative for blurred vision and double vision.   Cardiovascular: Negative for chest pain and palpitations.   Gastrointestinal: Positive for abdominal pain (RUQ). Negative for heartburn, nausea and vomiting.   Genitourinary: Negative for dysuria and urgency.   Musculoskeletal: Negative for myalgias and neck pain.   Skin: Negative for itching and rash.   Neurological: Negative for dizziness and headaches.   Endo/Heme/Allergies: Negative for environmental allergies. Does not bruise/bleed easily.   Psychiatric/Behavioral: Negative for depression and suicidal ideas.       Objective Data:   Physical Exam:   Vitals:   Temp:  [36.1 °C (97 °F)-37.2 °C (99 °F)] 36.1 °C (97 °F)  Pulse:  [68-85] 68  Resp:  [16-18] 16  BP: ()/(55-80) 97/55  SpO2:  [94 %-98 %] 95 %    Physical Exam  Constitutional:       General: She is not in acute distress.     Appearance: Normal appearance. She is not ill-appearing or toxic-appearing.   HENT:      Head: Normocephalic and atraumatic.     "  Right Ear: Tympanic membrane normal. There is no impacted cerumen.      Left Ear: Tympanic membrane normal. There is no impacted cerumen.      Nose: Nose normal. No congestion or rhinorrhea.      Mouth/Throat:      Mouth: Mucous membranes are moist.      Pharynx: Oropharynx is clear. No oropharyngeal exudate or posterior oropharyngeal erythema.   Eyes:      Extraocular Movements: Extraocular movements intact.      Conjunctiva/sclera: Conjunctivae normal.      Pupils: Pupils are equal, round, and reactive to light.   Cardiovascular:      Rate and Rhythm: Normal rate and regular rhythm.      Pulses: Normal pulses.      Heart sounds: Normal heart sounds. No murmur heard.   No friction rub. No gallop.    Pulmonary:      Effort: Pulmonary effort is normal. No respiratory distress.      Breath sounds: Normal breath sounds. No stridor. No wheezing, rhonchi or rales.   Abdominal:      General: Abdomen is flat.      Palpations: There is no mass.      Tenderness: There is abdominal tenderness (RUQ with Noble's sign). There is guarding.   Musculoskeletal:         General: No swelling or tenderness. Normal range of motion.      Cervical back: Normal range of motion. No rigidity.   Skin:     General: Skin is warm and dry.      Capillary Refill: Capillary refill takes less than 2 seconds.      Coloration: Skin is not jaundiced.   Neurological:      Mental Status: She is alert and oriented to person, place, and time.      Cranial Nerves: No cranial nerve deficit.      Sensory: No sensory deficit.           Labs:   Recent Results (from the past 24 hour(s))   URINE CULTURE(NEW)    Collection Time: 10/27/21  8:06 PM    Specimen: Urine, Clean Catch   Result Value Ref Range    Significant Indicator NEG     Source UR     Site URINE, CLEAN CATCH     Culture Result No growth at 24 hours.    Basic Metabolic Panel    Collection Time: 10/28/21  1:12 AM   Result Value Ref Range    Sodium 136 135 - 145 mmol/L    Potassium 3.4 (L) 3.6 - 5.5  mmol/L    Chloride 102 96 - 112 mmol/L    Co2 24 20 - 33 mmol/L    Glucose 101 (H) 65 - 99 mg/dL    Bun 7 (L) 8 - 22 mg/dL    Creatinine 0.58 0.50 - 1.40 mg/dL    Calcium 8.1 (L) 8.5 - 10.5 mg/dL    Anion Gap 10.0 7.0 - 16.0   MAGNESIUM    Collection Time: 10/28/21  1:12 AM   Result Value Ref Range    Magnesium 2.0 1.5 - 2.5 mg/dL   CBC WITH DIFFERENTIAL    Collection Time: 10/28/21  1:12 AM   Result Value Ref Range    WBC 15.5 (H) 4.8 - 10.8 K/uL    RBC 3.82 (L) 4.20 - 5.40 M/uL    Hemoglobin 11.3 (L) 12.0 - 16.0 g/dL    Hematocrit 34.0 (L) 37.0 - 47.0 %    MCV 89.0 81.4 - 97.8 fL    MCH 29.6 27.0 - 33.0 pg    MCHC 33.2 (L) 33.6 - 35.0 g/dL    RDW 44.3 35.9 - 50.0 fL    Platelet Count 217 164 - 446 K/uL    MPV 10.3 9.0 - 12.9 fL    Neutrophils-Polys 85.80 (H) 44.00 - 72.00 %    Lymphocytes 10.20 (L) 22.00 - 41.00 %    Monocytes 3.00 0.00 - 13.40 %    Eosinophils 0.10 0.00 - 6.90 %    Basophils 0.30 0.00 - 1.80 %    Immature Granulocytes 0.60 0.00 - 0.90 %    Nucleated RBC 0.00 /100 WBC    Neutrophils (Absolute) 13.31 (H) 2.00 - 7.15 K/uL    Lymphs (Absolute) 1.59 1.00 - 4.80 K/uL    Monos (Absolute) 0.46 0.00 - 0.85 K/uL    Eos (Absolute) 0.02 0.00 - 0.51 K/uL    Baso (Absolute) 0.05 0.00 - 0.12 K/uL    Immature Granulocytes (abs) 0.10 0.00 - 0.11 K/uL    NRBC (Absolute) 0.00 K/uL   ESTIMATED GFR    Collection Time: 10/28/21  1:12 AM   Result Value Ref Range    GFR If African American >60 >60 mL/min/1.73 m 2    GFR If Non African American >60 >60 mL/min/1.73 m 2       Imaging:   Independant Imaging Review: Completed  NM-BILIARY (HIDA) SCAN WITH CCK   Final Result      Motion degraded exam due to patient noncooperation. No filling of the gallbladder seen, consistent with cystic duct obstruction.      CT-ABDOMEN-PELVIS WITH   Final Result      1.  Gallstone in the gallbladder neck with distended gallbladder with gallbladder wall thickening and pericholecystic fluid consistent with acute cholecystitis.      2.   Inflammation in the pelvis which appears centered around the uterus and adnexa versus the sigmoid colon could indicate PID or perhaps represent inflammation tracking down from the gallbladder.      3.  Diverticulosis without definite evidence of diverticulitis.      US-RUQ   Final Result         1.  Fixed stone at the gallbladder neck with gallbladder distention and gallbladder filled with sludge with positive sonographic Noble's sign, however normal gallbladder wall thickness. Findings concerning but insufficient for diagnosis of    cholecystitis. Could be further evaluated with HIDA scan.   2.  Hepatomegaly and echogenic liver, compatible with fatty change versus fibrosis.   3.  Atherosclerosis      [id number] VIEW   Final Result         1. Nonobstructive bowel gas pattern.   2. Centrally lucent gallstone is present.      CT-HEAD W/O   Final Result         1.  No acute intracranial abnormality is identified, there are nonspecific white matter changes, commonly associated with small vessel ischemic disease.  Associated mild cerebral atrophy is noted.   2.  Right maxillary sinus mucus retention cyst   3.  Atherosclerosis.      DX-CHEST-PORTABLE (1 VIEW)   Final Result         1.  No acute cardiopulmonary disease.   2.  Round calcification in the right upper quadrant, appearance suggests calcified gallstone.   3.  Atherosclerosis          Problem Representation:        Cholecystitis  Assessment & Plan  -  Acute calculous cholecystitis  - galls stones visualized in 3 imaging modalities  - US showed: Fixed stone at the gallbladder neck with gallbladder distention and gallbladder filled with sludge with positive sonographic Noble's sign, however normal gallbladder wall thickness. F/u HIDA.  -pt is on Zosyn. LR.  - will consult surgery.    Sepsis (HCC)- (present on admission)  Assessment & Plan  This is Sepsis Present on admission  SIRS criteria identified on my evaluation include:Tachycarida Leukocytosis, with WBC  greater than 12,000  Source is Gallbladder  Sepsis protocol initiated  Fluid resuscitation ordered per protocol  IV antibiotics as appropriate for source of sepsis  While organ dysfunction may be noted elsewhere in this problem list or in the chart, degree of organ dysfunction does not meet CMS criteria for severe sepsis          Altered mental state  Assessment & Plan  - pt was alert and oriented to person, place but not time in ED.  - after antibiotic therapy she has been A&Ox3 since 10/27.      Tubes: none  Lines: pIV (RUE)  Drains: none  Fluids: LR  Diet: Regular  DVT prophylaxis: enoxaparin  GI prophylaxis: none  Antibiotics: Zosyn  Code Status: FULL  Disposition: ON Zosyn

## 2021-10-29 NOTE — PROGRESS NOTES
[facility] INTERNAL MEDICINE ATTENDING NOTE:   Kevin Woods MD      Visit Time:   Attending/resident bedside rounds 9-11:30 AM     PATIENT ID  Name:             Dottie Enamorado     YOB: 1955  Age:                 66 y.o.  female   MRN:               [id number]  Admit:  10/25/2021     I saw and examined the patient and discussed the management with the resident staff.  I reviewed the resident's note and agree with the resident's findings and plan as documented in the resident's note except as documented in the attending note. Please reference resident daily note for complete information.    The chart was reviewed and summarized.  Available labs, imaging, O2 sats ,  EKGs were reviewed. Available nursing, consultant, and resident notes were reviewed. I am actively involved in the patient's care.                                                                              ______________________________________________________________________            66(  admit Oct 25, IP, sepsis   )  INTERVAL:  Chart reviewed/summarized,       Oct 28AM:   AF, H R78, , 94% RA  confusion, no progressive sirs, abd soft, +ruq ttp  HIDA in process --> surgical consult   HIDA Oct 28: no filling of the gallbladder, cystic duct obstruction, CBD uptake normal , liver uptake normal   CT: cholecystitis? Hida pending, no abscess, stranding sigmoid colon , heme pos stool , CBD 3.6mm, no vascular ischemia apparent  NPT 24/22, SR, SB () , UC negative   WBC 15 (24-29), HB 11.3, , K 3.4 (3.2), CO2 24, CR 0.58 , AC 13, mG 2 , BC 1/3 group A strep     MEDR: zosyn  enox LD, LR , KCL      STREP GROUP A   CT with: stone,  OCT 27: GB neck, GB distended, GB wall thickening with pericholic inflammation, no CBD dilitation, LK stone/Nobs, GIT : diverticulosis w/o definite diverticulitsi, but  apparent fat stranding around uterus /sigmoid colon ?  - no colitis   MEDR: zosyn, enox LD, bowel, LR,  KCL  intermittent abd pain,  better -- > HIDA, CT abd pending still , no progressive sepsis, no clinical bleeding, abd soft w/o rebound , no progessive SIRS, on zosyn, GROUP A strep 1/3 BC , neg on repeat               Oct 27AM;   AF, HR 54, , 97%RA  abd pain, heme pos stool. Imaging pending, zosyn, BC  WBC 24 (34), HB 11.8 (14),  (299), ANC 22, ), Na 134, K 3.2, CO2 22,  , CR 0.76 (1.01)  mG 1.7   BC 10/25 negative         Oct 26AM:   confused, nonfocal, baseline? , no JVD, lungs clear, abd soft, no skin lessions, unclear source of sepsis, +BC ?  - empiric vanco, C3, CT abdomen for occult process   AF, HR 98 --> 74, , 98% RA, 126 #  WBC 34-38, HB 12-14, -299, ANC [id number],   Na 136,  K3.8, CO2 23, -152, BUN 16, CR 1 (1.18), CA 8.4 (9.2), AST/ALT, ALP , wnl, BR 0.5      CORE:  Code Status (  FULL)  jessica Spencer [phone]  --------------------------------------------------------------------------------------------------  Hospital Summary/ Patient System Review      NP:   *admit(  confusion, fever, temp 102,  nonfocal, no meningismus  CT head Aug 25: not acute, SVID, right maxillary cys, ASVD  Impression: decompensated dementia ?  - -acute sepsis encephalopathy  --> sepsis, fluid repair      EENT:   *admit(  bruise face , no meningismus, edentulous, no ear, sinus pain     MSK/PAIN:   *admit(  neg     CVS:   *admit(  , H R98,  no apparent HF, no edema, no JVD, pBNP 1617 , clinically no acute HF      PULM:   *admit(  remote smoker , 94% RA, lungs clear , incr RR      GI:   *admit(  BMI 24,  acute NVD x 1 day, AST/ALT, ALP , wnl, BR 0.5 , soft, no obvious peritonitis  US Oct 27: echogenic liver, nodular? , no mass, 18cm/hepatomegaly, GB sludge, distended, no pericholic sign, CBD 3.6mm, fixed stone neck , cholecystitis ?   CT with: stone,  OCT 27: GB neck, GB distended, GB wall thickening with pericholic inflammation, no CBD dilitation, LK stone/Nobs, GIT : diverticulosis w/o definite  diverticulitsi, but  apparent fat stranding around uterus /sigmoid colon ?  - no colitis   Impressoin: acute cholecysitis ? , diverticulitis ?  - neither compatible with GROUP A blood culture ? --> zosyn, HIDA    Impression: NAFLD, hepatomegaly   Impression: stool heme POS  --> elective colonoscopy, sigmoid abn on CT scan      :   *admit(  UA: WBC, neg nitrates, no bacteria, WBC 5-10, RBC 5-10, hyaline casts 3-5, lactic acid 2.7   Impression: pyuria, no  s/s however -- UTI sepsis not excluded      RENAL:   *admit(  Na 136,  K3.8, CO2 23, -152, BUN 16, CR 1 (1.18), CA 8.4 (9.2),      HEME:   *admit(  WBC 34-38, HB 12-14, -299, ANC [id number],   Impression: ANC high, sepsis, source ?         ENDO:   *admit(  ,  A1C 5.3,  TSH 0.820      DERM/BREAST:   *admit(       ID:   *admit(  SIRS, Qsofa 2/3, fever reported (not documented) , WBC high, procalcitonin 30 , ESR 40, CRP 15   Impression: SEPSIS, with encephalopathy,  , GI source ?  BC strep group A 1/3 (source not clear)    -- fluid repair, C3/flagyl -> zosyn , abdominal imaging (cholecystitis, diverticulitis ? ) (not compatible with STREP A bacteremia however)    Impression: hx COVID Aug 2021

## 2021-10-29 NOTE — PROGRESS NOTES
Monitor Summary:   Rhythm: Sinus Tach/Rhythm  Rate:   Measurement: .21/.08/.39  Ectopy: PVCs, PACs

## 2021-10-29 NOTE — NON-PROVIDER
Daily Progress Note:     Date of Service: 10/29/2021  Primary Team: carmen   Attending: Kevin Woods M.D.   Senior Resident: Dr. Huynh  Intern: Dr. Dc  Contact:  [phone]    Chief Complaint:   Altered Level of Consciousness     Subjective:  Dottie Enamorado is a 66 y.o. female who presented to the ED on 10/25 with altered mental status. She was transfered by EMS from urgent care where she presented disoriented and with stomach pain. Denied abdominal pain, cough, SOB, dysuria, oral temp 102.4. She was transferred for higher level of care due to disorientation (Oriented to person and situation), and fever, and was admitted for ALOC.     Interval History:  - No acute events overnight  -Continues on  Piperacillin-tazobactam (zosyn) TID IV (started 10/26)              -Blood Cx positive for beta hemolytic streptococcus group A              -WBC 8.9 wnl, trending down from high 38.2 at admission              -Vitals wnl, afebrile  -?? 10/28 HIDA Motion degraded exam due to patient noncooperation. No filling of the gallbladder seen, consistent with cystic duct obstruction.    Today,   Patient alert with orientation to person, not to place, situation, time. She reports loose stools, chronic back pain. Reports long history of duodenal ulcer for which she takes medicine inconsistently. Denies abdominal pain, headache, fever, chills, N/V, rash, SOB, cough, chest pain, dysuria.     Objective Data:   Physical Exam:   Vitals:   Temp:  [36.1 °C (97 °F)-36.7 °C (98.1 °F)] 36.4 °C (97.6 °F)  Pulse:  [68-98] 77  Resp:  [16-18] 16  BP: ()/(55-80) 143/80  SpO2:  [91 %-98 %] 96 %  ??Neuropsych: alert, oriented to person. Not oriented to place, situation, not to time. No acute distress.   Head: Resolving bruising under left eye. Head normocephalic, atraumatic without tenderness, visible or palpable masses, depressions, or scarring.   Eyes: sclera is non-icteric, conjunctivae without exudates or hemorrhage.   Ears:  external ear and ear canal are non-tender and without swelling. Canal clear without discharge. Hearing intact.   Nose: nares are patent bilaterally. No rhinnorhea.   Throat: oral muscoa pink and moist, with good dentition. Tongue normal in appearance without lesions and with good symmetrical movement. No buccal nodules or lesions. Pharynx is normal in apparent without tonsillar swelling or exudates.   Neck: Neck is supple without adenopathy. No JVD.  MSK/Derm: No joint swelling, eythema. No rash. Warm well perfused. Pulses palpable.   Cardiac: RRR. No m/g/r. Capillary refill <2 sec.  Respiratory:  CTAB. No accessory muscle use.   Abdominal: Soft. Mild diffuse tenderness to palpation RUQ, epigastric > LUQ,LLQ,RLQ. Positive coffey. Guarding RUQ. No rebound tenderness. No visible lesions or scars. Bowel sounds are present and normoactive. No masses, hepatomegaly or splenomegaly.      Labs:   Recent Labs     10/28/21  0112 10/29/21  0146 10/29/21  1003   WBC 15.5* 8.9 8.6   RBC 3.82* 3.52* 4.06*   HEMOGLOBIN 11.3* 10.7* 12.1   HEMATOCRIT 34.0* 31.5* 36.2*   MCV 89.0 89.5 89.2   MCH 29.6 30.4 29.8   RDW 44.3 44.7 44.8   PLATELETCT 217 218 244   MPV 10.3 10.1 9.7   NEUTSPOLYS 85.80* 72.90* 77.30*   LYMPHOCYTES 10.20* 19.00* 16.00*   MONOCYTES 3.00 6.60 4.80   EOSINOPHILS 0.10 0.40 0.20   BASOPHILS 0.30 0.40 0.50     Recent Labs     10/27/21  0237 10/28/21  0112 10/29/21  0146   SODIUM 134* 136 139   POTASSIUM 3.2* 3.4* 3.6   CHLORIDE 102 102 105   CO2 22 24 23   GLUCOSE 146* 101* 74   BUN 17 7* 4*       Imaging:   NM-BILIARY (HIDA) SCAN WITH CCK   Final Result      Motion degraded exam due to patient noncooperation. No filling of the gallbladder seen, consistent with cystic duct obstruction.      CT-ABDOMEN-PELVIS WITH   Final Result      1.  Gallstone in the gallbladder neck with distended gallbladder with gallbladder wall thickening and pericholecystic fluid consistent with acute cholecystitis.      2.  Inflammation in  the pelvis which appears centered around the uterus and adnexa versus the sigmoid colon could indicate PID or perhaps represent inflammation tracking down from the gallbladder.      3.  Diverticulosis without definite evidence of diverticulitis.      US-RUQ   Final Result         1.  Fixed stone at the gallbladder neck with gallbladder distention and gallbladder filled with sludge with positive sonographic Noble's sign, however normal gallbladder wall thickness. Findings concerning but insufficient for diagnosis of    cholecystitis. Could be further evaluated with HIDA scan.   2.  Hepatomegaly and echogenic liver, compatible with fatty change versus fibrosis.   3.  Atherosclerosis      [id number] VIEW   Final Result         1. Nonobstructive bowel gas pattern.   2. Centrally lucent gallstone is present.      CT-HEAD W/O   Final Result         1.  No acute intracranial abnormality is identified, there are nonspecific white matter changes, commonly associated with small vessel ischemic disease.  Associated mild cerebral atrophy is noted.   2.  Right maxillary sinus mucus retention cyst   3.  Atherosclerosis.      DX-CHEST-PORTABLE (1 VIEW)   Final Result         1.  No acute cardiopulmonary disease.   2.  Round calcification in the right upper quadrant, appearance suggests calcified gallstone.   3.  Atherosclerosis          Problem Representation:   Dottie Enamorado is a 66 y.o. female who presented to the ED on 10/25 with altered mental status (Oriented to person and situation), fever, and abdominal pain. She was admitted for sepsis and to investigate etiology of AMS.    #sepsis, unknown etiology  +SIRS and 2/3 Qsofa. High ANC, presumed bacterial sepsis, inflammatory, drug hypersensitivity. Neoplastic causes not apparent.   Vitals: afebrile, /80.   Labs: WBC 8.9 wnl, trending down from high 38.2 at admission.  Possible Source:   Pulm: Negative CXR to suggest pulmonary source.   GI: No diarrhea to suggest C.  Dif. 10/27 CT abd/pelvis findings consistent with acute cholecystitis. Clarify radiographic diverticulosis/diverticulitis/PID.   : Possible asymptomatic pyuria, less likely source of  sepsis. Nitrite negative would be staph or enterococcus.   Urine Cx negative  Derm: Blood Cx - gram positive cocci- possible strep. No skin/oral lesions on exam  Plan:  - Continue Piperacillin-tazobactam (zosyn) TID IV   - Surgery consulted for possible cholecystectomy  - Continue Skin checks to investigate possible derm source     #decompensated dementia w/ encephalopathy, likely systemic sepsis  AOx1 (person only). 10/25 CT Head no acute intracranial abnormality. No meningismus.   Plan:  - Continue  mL/hr continuous  - Start Low-dose Seroquel  - Treat sepsis    #cholecystitis v diverticulitis  Occult stool positive 10/27. Positive coffey sign. 10/27 CT abd/pelvis findings consistent with acute cholecystitis.  10/28 HIDA findings consistent with cystic duct obstruction.   Plan:  - Check with radiology regarding 10/27 CT Abd/Pelvis - acute cholecystitis, and possible PID/inflammation from gallbladder (uterus/adnexa/sigmoid colon), diverticulosis without evidence diverticulitis  - Surgery consulted for possible cholecystectomy    #Duodenal Ulcer  Patient reported history of duodenal ulcer, inconsistent PPI. Occult stool positive 10/27. Epigastric tenderness on exam.   - Order Stool H. Pylori  - Start PPI  - EGD as outpatient   - Avoid NSAID    #hypokalemia, likely 2/2 insufficient intake, inflammatory state  KCl 40mEq daily   Magnesium 2.0 wnl  Potassium 3.6 today  Likely due to insufficient intake  Plan:   - Continue K+ supplementation

## 2021-10-29 NOTE — PROGRESS NOTES
Anticipated discharge disposition: home with caregiver    Action:   Discussed this pt in IDT rounds, per MD pt has a caregiver named Samson [phone]. Information MD obtained from a previous note. Will follow up with samson and update pts contact information.     Addendum @ 4107 Attempted to call above phone# for care giver Samson, the phone was answered by Yasmin who stated she is coming to see the pt now and will be able to talk when she gets here.     Addendum @0744  Went to pts bedside to see if family is present. No family at bedside. Per bedside RN she also spoke with family Yasmin who has said she is on her way since this AM.     Barriers to discharge: medical clearance    Plan: will follow up with yasmin to verify pts info and DC plan.

## 2021-10-30 LAB
ALBUMIN SERPL BCP-MCNC: 3.2 G/DL (ref 3.2–4.9)
ALBUMIN/GLOB SERPL: 1.1 G/DL
ALP SERPL-CCNC: 97 U/L (ref 30–99)
ALT SERPL-CCNC: 23 U/L (ref 2–50)
ANION GAP SERPL CALC-SCNC: 10 MMOL/L (ref 7–16)
AST SERPL-CCNC: 27 U/L (ref 12–45)
BASOPHILS # BLD AUTO: 0.6 % (ref 0–1.8)
BASOPHILS # BLD: 0.05 K/UL (ref 0–0.12)
BILIRUB SERPL-MCNC: 0.3 MG/DL (ref 0.1–1.5)
BUN SERPL-MCNC: 7 MG/DL (ref 8–22)
CALCIUM SERPL-MCNC: 8.8 MG/DL (ref 8.5–10.5)
CHLORIDE SERPL-SCNC: 108 MMOL/L (ref 96–112)
CO2 SERPL-SCNC: 22 MMOL/L (ref 20–33)
CREAT SERPL-MCNC: 0.68 MG/DL (ref 0.5–1.4)
EOSINOPHIL # BLD AUTO: 0.16 K/UL (ref 0–0.51)
EOSINOPHIL NFR BLD: 1.9 % (ref 0–6.9)
ERYTHROCYTE [DISTWIDTH] IN BLOOD BY AUTOMATED COUNT: 47 FL (ref 35.9–50)
GLOBULIN SER CALC-MCNC: 2.9 G/DL (ref 1.9–3.5)
GLUCOSE SERPL-MCNC: 114 MG/DL (ref 65–99)
HCT VFR BLD AUTO: 35.3 % (ref 37–47)
HGB BLD-MCNC: 11.6 G/DL (ref 12–16)
IMM GRANULOCYTES # BLD AUTO: 0.13 K/UL (ref 0–0.11)
IMM GRANULOCYTES NFR BLD AUTO: 1.5 % (ref 0–0.9)
LYMPHOCYTES # BLD AUTO: 2.53 K/UL (ref 1–4.8)
LYMPHOCYTES NFR BLD: 29.6 % (ref 22–41)
MCH RBC QN AUTO: 30.1 PG (ref 27–33)
MCHC RBC AUTO-ENTMCNC: 32.9 G/DL (ref 33.6–35)
MCV RBC AUTO: 91.5 FL (ref 81.4–97.8)
MONOCYTES # BLD AUTO: 0.63 K/UL (ref 0–0.85)
MONOCYTES NFR BLD AUTO: 7.4 % (ref 0–13.4)
NEUTROPHILS # BLD AUTO: 5.06 K/UL (ref 2–7.15)
NEUTROPHILS NFR BLD: 59 % (ref 44–72)
NRBC # BLD AUTO: 0 K/UL
NRBC BLD-RTO: 0 /100 WBC
PLATELET # BLD AUTO: 256 K/UL (ref 164–446)
PMV BLD AUTO: 9.7 FL (ref 9–12.9)
POTASSIUM SERPL-SCNC: 4 MMOL/L (ref 3.6–5.5)
PROT SERPL-MCNC: 6.1 G/DL (ref 6–8.2)
RBC # BLD AUTO: 3.86 M/UL (ref 4.2–5.4)
SODIUM SERPL-SCNC: 140 MMOL/L (ref 135–145)
WBC # BLD AUTO: 8.6 K/UL (ref 4.8–10.8)

## 2021-10-30 PROCEDURE — A9270 NON-COVERED ITEM OR SERVICE: HCPCS

## 2021-10-30 PROCEDURE — 80053 COMPREHEN METABOLIC PANEL: CPT

## 2021-10-30 PROCEDURE — 700105 HCHG RX REV CODE 258: Performed by: INTERNAL MEDICINE

## 2021-10-30 PROCEDURE — A9270 NON-COVERED ITEM OR SERVICE: HCPCS | Performed by: INTERNAL MEDICINE

## 2021-10-30 PROCEDURE — 99232 SBSQ HOSP IP/OBS MODERATE 35: CPT | Mod: GC | Performed by: INTERNAL MEDICINE

## 2021-10-30 PROCEDURE — 700102 HCHG RX REV CODE 250 W/ 637 OVERRIDE(OP): Performed by: INTERNAL MEDICINE

## 2021-10-30 PROCEDURE — 700111 HCHG RX REV CODE 636 W/ 250 OVERRIDE (IP): Performed by: INTERNAL MEDICINE

## 2021-10-30 PROCEDURE — 87400 INFLUENZA A/B EACH AG IA: CPT

## 2021-10-30 PROCEDURE — A9270 NON-COVERED ITEM OR SERVICE: HCPCS | Performed by: STUDENT IN AN ORGANIZED HEALTH CARE EDUCATION/TRAINING PROGRAM

## 2021-10-30 PROCEDURE — C9803 HOPD COVID-19 SPEC COLLECT: HCPCS | Performed by: SURGERY

## 2021-10-30 PROCEDURE — 700102 HCHG RX REV CODE 250 W/ 637 OVERRIDE(OP)

## 2021-10-30 PROCEDURE — 85025 COMPLETE CBC W/AUTO DIFF WBC: CPT

## 2021-10-30 PROCEDURE — 700111 HCHG RX REV CODE 636 W/ 250 OVERRIDE (IP): Performed by: STUDENT IN AN ORGANIZED HEALTH CARE EDUCATION/TRAINING PROGRAM

## 2021-10-30 PROCEDURE — 99233 SBSQ HOSP IP/OBS HIGH 50: CPT | Mod: 57 | Performed by: SURGERY

## 2021-10-30 PROCEDURE — 36415 COLL VENOUS BLD VENIPUNCTURE: CPT

## 2021-10-30 PROCEDURE — 700102 HCHG RX REV CODE 250 W/ 637 OVERRIDE(OP): Performed by: STUDENT IN AN ORGANIZED HEALTH CARE EDUCATION/TRAINING PROGRAM

## 2021-10-30 PROCEDURE — 770001 HCHG ROOM/CARE - MED/SURG/GYN PRIV*

## 2021-10-30 PROCEDURE — 87426 SARSCOV CORONAVIRUS AG IA: CPT

## 2021-10-30 RX ORDER — QUETIAPINE FUMARATE 25 MG/1
50 TABLET, FILM COATED ORAL NIGHTLY
Status: DISCONTINUED | OUTPATIENT
Start: 2021-10-30 | End: 2021-11-01 | Stop reason: HOSPADM

## 2021-10-30 RX ADMIN — POTASSIUM CHLORIDE 40 MEQ: 1500 TABLET, EXTENDED RELEASE ORAL at 08:35

## 2021-10-30 RX ADMIN — Medication 5 MG: at 21:12

## 2021-10-30 RX ADMIN — OMEPRAZOLE 20 MG: 20 CAPSULE, DELAYED RELEASE ORAL at 08:34

## 2021-10-30 RX ADMIN — QUETIAPINE FUMARATE 25 MG: 25 TABLET ORAL at 08:35

## 2021-10-30 RX ADMIN — PIPERACILLIN AND TAZOBACTAM 3.38 G: 3; .375 INJECTION, POWDER, LYOPHILIZED, FOR SOLUTION INTRAVENOUS; PARENTERAL at 16:33

## 2021-10-30 RX ADMIN — ENOXAPARIN SODIUM 40 MG: 40 INJECTION SUBCUTANEOUS at 06:00

## 2021-10-30 RX ADMIN — QUETIAPINE FUMARATE 50 MG: 25 TABLET ORAL at 21:12

## 2021-10-30 RX ADMIN — PIPERACILLIN AND TAZOBACTAM 3.38 G: 3; .375 INJECTION, POWDER, LYOPHILIZED, FOR SOLUTION INTRAVENOUS; PARENTERAL at 23:05

## 2021-10-30 ASSESSMENT — ENCOUNTER SYMPTOMS
DIZZINESS: 0
NECK PAIN: 0
HEADACHES: 0
CHILLS: 0
DOUBLE VISION: 0
PALPITATIONS: 0
FEVER: 0
BRUISES/BLEEDS EASILY: 0
MYALGIAS: 0
BLURRED VISION: 0
ABDOMINAL PAIN: 1
COUGH: 0

## 2021-10-30 ASSESSMENT — FIBROSIS 4 INDEX: FIB4 SCORE: 1.5

## 2021-10-30 ASSESSMENT — PAIN DESCRIPTION - PAIN TYPE
TYPE: ACUTE PAIN
TYPE: ACUTE PAIN

## 2021-10-30 NOTE — PROGRESS NOTES
0100  Patient IV was found to be leaking and was removed. Patient is uncooperative and refuses new IV at this time.    0630  Patient remains uncooperative. Patient refuses morning medications and new IV.

## 2021-10-30 NOTE — PROGRESS NOTES
Daily Progress Note:     Date of Service: 10/30/2021  Primary Team: [facility] Team   Attending: Dr. Hardwick   Senior Resident: Dr. Huynh  Intern: Dr. Dc  Contact:  [phone]    Chief Complaint:  Abd pain    ID:  Ms. Enamorado is a 67 yo F with unknown PMHx 2/2 to AMS admitted to floor as a transfer from urgent care where she presented yesterday (10/25/2021) for fever, chills.    Subjective:  Pt was having delirium per nursing staff overnight.  Pt c/o RUQ abd pain.  Denies N/V/D.    Interval History:  Delirium yesterday evening. Pt removed IV and has not received Zosyn since.  Pt received 2 doses of seroquel and 1 dose haldol.  At first difficult to discern agitation from delirium.  Pt somnulent on exam.  Changed dosage of Seroquel from 25 mg TID to 50 mg at bed time.  CBC WNL.  Chem Panel WNL.  Consulted recommend NPO at midnight.   Consultants/Specialty:  General Surgery.    Review of Systems:    Review of Systems   Constitutional: Negative for chills and fever.   HENT: Negative for hearing loss and tinnitus.    Eyes: Negative for blurred vision and double vision.   Respiratory: Negative for cough.    Cardiovascular: Negative for chest pain and palpitations.   Gastrointestinal: Positive for abdominal pain (RUQ).   Genitourinary: Negative for dysuria and urgency.   Musculoskeletal: Negative for myalgias and neck pain.   Skin: Negative for itching.   Neurological: Negative for dizziness and headaches.   Endo/Heme/Allergies: Negative for environmental allergies. Does not bruise/bleed easily.       Objective Data:   Physical Exam:   Vitals:   Temp:  [36.5 °C (97.7 °F)-36.7 °C (98.1 °F)] 36.7 °C (98 °F)  Pulse:  [63-90] 64  Resp:  [16-18] 16  BP: (121-154)/(66-80) 121/66  SpO2:  [92 %-98 %] 96 %    Physical Exam  Constitutional:       General: She is not in acute distress.     Appearance: Normal appearance. She is not ill-appearing or toxic-appearing.   HENT:      Head: Normocephalic and atraumatic.      Right  Ear: Tympanic membrane normal. There is no impacted cerumen.      Left Ear: Tympanic membrane normal. There is no impacted cerumen.      Nose: Nose normal. No congestion or rhinorrhea.      Mouth/Throat:      Mouth: Mucous membranes are moist.      Pharynx: Oropharynx is clear. No oropharyngeal exudate or posterior oropharyngeal erythema.   Eyes:      General: No scleral icterus.        Right eye: No discharge.         Left eye: No discharge.      Extraocular Movements: Extraocular movements intact.      Conjunctiva/sclera: Conjunctivae normal.      Pupils: Pupils are equal, round, and reactive to light.   Cardiovascular:      Rate and Rhythm: Normal rate and regular rhythm.      Pulses: Normal pulses.      Heart sounds: No murmur heard.   No friction rub. No gallop.    Pulmonary:      Effort: Pulmonary effort is normal. No respiratory distress.      Breath sounds: Normal breath sounds. No wheezing, rhonchi or rales.   Abdominal:      General: Abdomen is flat. There is no distension.      Palpations: Abdomen is soft.      Tenderness: There is abdominal tenderness (RUQ).   Genitourinary:     Vagina: No vaginal discharge.      Rectum: Guaiac result negative.   Musculoskeletal:         General: No swelling, tenderness or deformity.      Cervical back: Normal range of motion. No rigidity or tenderness.   Skin:     General: Skin is warm and dry.      Capillary Refill: Capillary refill takes less than 2 seconds.      Coloration: Skin is not jaundiced.   Neurological:      General: No focal deficit present.      Mental Status: She is alert and oriented to person, place, and time.      Comments: Somnolent but arousable to voice.  Follows commands appropriately.             Labs:   Recent Results (from the past 24 hour(s))   Comp Metabolic Panel    Collection Time: 10/30/21  2:12 AM   Result Value Ref Range    Sodium 140 135 - 145 mmol/L    Potassium 4.0 3.6 - 5.5 mmol/L    Chloride 108 96 - 112 mmol/L    Co2 22 20 - 33  mmol/L    Anion Gap 10.0 7.0 - 16.0    Glucose 114 (H) 65 - 99 mg/dL    Bun 7 (L) 8 - 22 mg/dL    Creatinine 0.68 0.50 - 1.40 mg/dL    Calcium 8.8 8.5 - 10.5 mg/dL    AST(SGOT) 27 12 - 45 U/L    ALT(SGPT) 23 2 - 50 U/L    Alkaline Phosphatase 97 30 - 99 U/L    Total Bilirubin 0.3 0.1 - 1.5 mg/dL    Albumin 3.2 3.2 - 4.9 g/dL    Total Protein 6.1 6.0 - 8.2 g/dL    Globulin 2.9 1.9 - 3.5 g/dL    A-G Ratio 1.1 g/dL   CBC WITH DIFFERENTIAL    Collection Time: 10/30/21  2:12 AM   Result Value Ref Range    WBC 8.6 4.8 - 10.8 K/uL    RBC 3.86 (L) 4.20 - 5.40 M/uL    Hemoglobin 11.6 (L) 12.0 - 16.0 g/dL    Hematocrit 35.3 (L) 37.0 - 47.0 %    MCV 91.5 81.4 - 97.8 fL    MCH 30.1 27.0 - 33.0 pg    MCHC 32.9 (L) 33.6 - 35.0 g/dL    RDW 47.0 35.9 - 50.0 fL    Platelet Count 256 164 - 446 K/uL    MPV 9.7 9.0 - 12.9 fL    Neutrophils-Polys 59.00 44.00 - 72.00 %    Lymphocytes 29.60 22.00 - 41.00 %    Monocytes 7.40 0.00 - 13.40 %    Eosinophils 1.90 0.00 - 6.90 %    Basophils 0.60 0.00 - 1.80 %    Immature Granulocytes 1.50 (H) 0.00 - 0.90 %    Nucleated RBC 0.00 /100 WBC    Neutrophils (Absolute) 5.06 2.00 - 7.15 K/uL    Lymphs (Absolute) 2.53 1.00 - 4.80 K/uL    Monos (Absolute) 0.63 0.00 - 0.85 K/uL    Eos (Absolute) 0.16 0.00 - 0.51 K/uL    Baso (Absolute) 0.05 0.00 - 0.12 K/uL    Immature Granulocytes (abs) 0.13 (H) 0.00 - 0.11 K/uL    NRBC (Absolute) 0.00 K/uL   ESTIMATED GFR    Collection Time: 10/30/21  2:12 AM   Result Value Ref Range    GFR If African American >60 >60 mL/min/1.73 m 2    GFR If Non African American >60 >60 mL/min/1.73 m 2       Imaging:   Independant Imaging Review: Completed  NM-BILIARY (HIDA) SCAN WITH CCK   Final Result      Motion degraded exam due to patient noncooperation. No filling of the gallbladder seen, consistent with cystic duct obstruction.      CT-ABDOMEN-PELVIS WITH   Final Result      1.  Gallstone in the gallbladder neck with distended gallbladder with gallbladder wall thickening and  pericholecystic fluid consistent with acute cholecystitis.      2.  Inflammation in the pelvis which appears centered around the uterus and adnexa versus the sigmoid colon could indicate PID or perhaps represent inflammation tracking down from the gallbladder.      3.  Diverticulosis without definite evidence of diverticulitis.      US-RUQ   Final Result         1.  Fixed stone at the gallbladder neck with gallbladder distention and gallbladder filled with sludge with positive sonographic Noble's sign, however normal gallbladder wall thickness. Findings concerning but insufficient for diagnosis of    cholecystitis. Could be further evaluated with HIDA scan.   2.  Hepatomegaly and echogenic liver, compatible with fatty change versus fibrosis.   3.  Atherosclerosis      [id number] VIEW   Final Result         1. Nonobstructive bowel gas pattern.   2. Centrally lucent gallstone is present.      CT-HEAD W/O   Final Result         1.  No acute intracranial abnormality is identified, there are nonspecific white matter changes, commonly associated with small vessel ischemic disease.  Associated mild cerebral atrophy is noted.   2.  Right maxillary sinus mucus retention cyst   3.  Atherosclerosis.      DX-CHEST-PORTABLE (1 VIEW)   Final Result         1.  No acute cardiopulmonary disease.   2.  Round calcification in the right upper quadrant, appearance suggests calcified gallstone.   3.  Atherosclerosis          Problem Representation:        Cholecystitis  Assessment & Plan  -  Acute calculous cholecystitis  -  Radiologic findings consistent with acute cholecystitis in three imaging modalities  - US showed: Fixed stone at the gallbladder neck with gallbladder distention and gallbladder filled with sludge with positive sonographic Noble's sign, however normal gallbladder wall thickness.   - HIDA scan>No filling of the gallbladder consistent with cystic duct obstruction  - CT scan abdomen was also obtained showed  distended gallbladder and pericholecystic fluid suggestive of cholecystitis  - pt is on Zosyn. LR.  - Surgery consulted on 10/28/2021.  Recommend NPO at midnight 10/30/21.    Sepsis (HCC)- (present on admission)  Assessment & Plan  This is Sepsis Present on admission  SIRS criteria identified on my evaluation include:Tachycarida Leukocytosis, with WBC greater than 12,000  Source is Gallbladder  Sepsis protocol initiated  Fluid resuscitation ordered per protocol  IV antibiotics as appropriate for source of sepsis  While organ dysfunction may be noted elsewhere in this problem list or in the chart, degree of organ dysfunction does not meet CMS criteria for severe sepsis      Altered mental state  Assessment & Plan  - pt was alert and oriented to person only   -Intermittently confusion suggested with resolving/agitation.  -Delirium suspected. 50 mg Seroquel at bedtime ordered.      Tubes: none  Lines: pIV (RUE)  Drains: none  Fluids: LR  Diet: Regular  DVT prophylaxis: enoxaparin  GI prophylaxis: none  Antibiotics: Zosyn  Code Status: FULL  Disposition: ON Zosyn

## 2021-10-30 NOTE — CARE PLAN
The patient is Watcher - Medium risk of patient condition declining or worsening    Shift Goals  Clinical Goals: safety  Patient Goals: comfort  Family Goals: n/a    Progress made toward(s) clinical / shift goals:      Fall precautions in place: bed alram, strip alarm, and telesitter. Bed in low and locked position. Patient educated to fall risk.      Problem: Respiratory  Goal: Patient will achieve/maintain optimum respiratory ventilation and gas exchange  Outcome: Progressing     Problem: Fall Risk  Goal: Patient will remain free from falls  Outcome: Progressing     Problem: Pain - Standard  Goal: Alleviation of pain or a reduction in pain to the patient’s comfort goal  Outcome: Progressing     Patient is not progressing towards the following goals:    Patient is confused and requires reorienting.    Problem: Knowledge Deficit - Standard  Goal: Patient and family/care givers will demonstrate understanding of plan of care, disease process/condition, diagnostic tests and medications  Outcome: Not Progressing

## 2021-10-30 NOTE — PROGRESS NOTES
Assumed care of patient at bedside report from NOC RN. Updated on POC. Patient currently A & O x 2 disoriented to time and place; on room air; up standby assist; without complaints of acute pain. Per NOC RN patient removed IV and refused replacement and AM meds. Discussed with patient, patient amenable to meds and IV placement. Call light within reach. Whiteboard updated. Fall precautions in place. Bed locked and in lowest position. All questions answered. No other needs indicated at this time.

## 2021-10-31 ENCOUNTER — ANESTHESIA EVENT (OUTPATIENT)
Dept: SURGERY | Facility: MEDICAL CENTER | Age: 66
DRG: 853 | End: 2021-10-31
Payer: MEDICARE

## 2021-10-31 ENCOUNTER — ANESTHESIA (OUTPATIENT)
Dept: SURGERY | Facility: MEDICAL CENTER | Age: 66
DRG: 853 | End: 2021-10-31
Payer: MEDICARE

## 2021-10-31 LAB
ANION GAP SERPL CALC-SCNC: 8 MMOL/L (ref 7–16)
BACTERIA BLD CULT: NORMAL
BASOPHILS # BLD AUTO: 0.5 % (ref 0–1.8)
BASOPHILS # BLD: 0.05 K/UL (ref 0–0.12)
BUN SERPL-MCNC: 9 MG/DL (ref 8–22)
CALCIUM SERPL-MCNC: 8.5 MG/DL (ref 8.5–10.5)
CHLORIDE SERPL-SCNC: 109 MMOL/L (ref 96–112)
CO2 SERPL-SCNC: 23 MMOL/L (ref 20–33)
CREAT SERPL-MCNC: 0.62 MG/DL (ref 0.5–1.4)
EOSINOPHIL # BLD AUTO: 0.29 K/UL (ref 0–0.51)
EOSINOPHIL NFR BLD: 3.1 % (ref 0–6.9)
ERYTHROCYTE [DISTWIDTH] IN BLOOD BY AUTOMATED COUNT: 47.6 FL (ref 35.9–50)
FLUAV AG SPEC QL IA: NEGATIVE
FLUBV AG SPEC QL IA: NEGATIVE
GLUCOSE SERPL-MCNC: 104 MG/DL (ref 65–99)
HCT VFR BLD AUTO: 35 % (ref 37–47)
HGB BLD-MCNC: 11.4 G/DL (ref 12–16)
IMM GRANULOCYTES # BLD AUTO: 0.17 K/UL (ref 0–0.11)
IMM GRANULOCYTES NFR BLD AUTO: 1.8 % (ref 0–0.9)
LYMPHOCYTES # BLD AUTO: 2.44 K/UL (ref 1–4.8)
LYMPHOCYTES NFR BLD: 26.2 % (ref 22–41)
MAGNESIUM SERPL-MCNC: 2 MG/DL (ref 1.5–2.5)
MCH RBC QN AUTO: 29.5 PG (ref 27–33)
MCHC RBC AUTO-ENTMCNC: 32.6 G/DL (ref 33.6–35)
MCV RBC AUTO: 90.7 FL (ref 81.4–97.8)
MONOCYTES # BLD AUTO: 0.59 K/UL (ref 0–0.85)
MONOCYTES NFR BLD AUTO: 6.3 % (ref 0–13.4)
NEUTROPHILS # BLD AUTO: 5.76 K/UL (ref 2–7.15)
NEUTROPHILS NFR BLD: 62.1 % (ref 44–72)
NRBC # BLD AUTO: 0 K/UL
NRBC BLD-RTO: 0 /100 WBC
PLATELET # BLD AUTO: 278 K/UL (ref 164–446)
PMV BLD AUTO: 9.5 FL (ref 9–12.9)
POTASSIUM SERPL-SCNC: 3.7 MMOL/L (ref 3.6–5.5)
RBC # BLD AUTO: 3.86 M/UL (ref 4.2–5.4)
SARS-COV+SARS-COV-2 AG RESP QL IA.RAPID: NOTDETECTED
SIGNIFICANT IND 70042: NORMAL
SITE SITE: NORMAL
SODIUM SERPL-SCNC: 140 MMOL/L (ref 135–145)
SOURCE SOURCE: NORMAL
SPECIMEN SOURCE: NORMAL
WBC # BLD AUTO: 9.3 K/UL (ref 4.8–10.8)

## 2021-10-31 PROCEDURE — 501838 HCHG SUTURE GENERAL: Performed by: SURGERY

## 2021-10-31 PROCEDURE — 502714 HCHG ROBOTIC SURGERY SERVICES: Performed by: SURGERY

## 2021-10-31 PROCEDURE — 47562 LAPAROSCOPIC CHOLECYSTECTOMY: CPT | Performed by: SURGERY

## 2021-10-31 PROCEDURE — 36415 COLL VENOUS BLD VENIPUNCTURE: CPT

## 2021-10-31 PROCEDURE — 160002 HCHG RECOVERY MINUTES (STAT): Performed by: SURGERY

## 2021-10-31 PROCEDURE — 700111 HCHG RX REV CODE 636 W/ 250 OVERRIDE (IP): Performed by: ANESTHESIOLOGY

## 2021-10-31 PROCEDURE — 160048 HCHG OR STATISTICAL LEVEL 1-5: Performed by: SURGERY

## 2021-10-31 PROCEDURE — 88304 TISSUE EXAM BY PATHOLOGIST: CPT

## 2021-10-31 PROCEDURE — 700105 HCHG RX REV CODE 258: Performed by: STUDENT IN AN ORGANIZED HEALTH CARE EDUCATION/TRAINING PROGRAM

## 2021-10-31 PROCEDURE — 700101 HCHG RX REV CODE 250: Performed by: SURGERY

## 2021-10-31 PROCEDURE — A9270 NON-COVERED ITEM OR SERVICE: HCPCS | Performed by: ANESTHESIOLOGY

## 2021-10-31 PROCEDURE — 700102 HCHG RX REV CODE 250 W/ 637 OVERRIDE(OP): Performed by: INTERNAL MEDICINE

## 2021-10-31 PROCEDURE — 700102 HCHG RX REV CODE 250 W/ 637 OVERRIDE(OP): Performed by: STUDENT IN AN ORGANIZED HEALTH CARE EDUCATION/TRAINING PROGRAM

## 2021-10-31 PROCEDURE — 99232 SBSQ HOSP IP/OBS MODERATE 35: CPT | Mod: GC | Performed by: INTERNAL MEDICINE

## 2021-10-31 PROCEDURE — 160036 HCHG PACU - EA ADDL 30 MINS PHASE I: Performed by: SURGERY

## 2021-10-31 PROCEDURE — 700105 HCHG RX REV CODE 258: Performed by: INTERNAL MEDICINE

## 2021-10-31 PROCEDURE — 700111 HCHG RX REV CODE 636 W/ 250 OVERRIDE (IP): Performed by: INTERNAL MEDICINE

## 2021-10-31 PROCEDURE — A9270 NON-COVERED ITEM OR SERVICE: HCPCS | Performed by: STUDENT IN AN ORGANIZED HEALTH CARE EDUCATION/TRAINING PROGRAM

## 2021-10-31 PROCEDURE — 700102 HCHG RX REV CODE 250 W/ 637 OVERRIDE(OP): Performed by: ANESTHESIOLOGY

## 2021-10-31 PROCEDURE — 80048 BASIC METABOLIC PNL TOTAL CA: CPT

## 2021-10-31 PROCEDURE — 160031 HCHG SURGERY MINUTES - 1ST 30 MINS LEVEL 5: Performed by: SURGERY

## 2021-10-31 PROCEDURE — 700101 HCHG RX REV CODE 250: Performed by: ANESTHESIOLOGY

## 2021-10-31 PROCEDURE — 502240 HCHG MISC OR SUPPLY RC 0272: Performed by: SURGERY

## 2021-10-31 PROCEDURE — 770001 HCHG ROOM/CARE - MED/SURG/GYN PRIV*

## 2021-10-31 PROCEDURE — 500868 HCHG NEEDLE, SURGI(VARES): Performed by: SURGERY

## 2021-10-31 PROCEDURE — 160035 HCHG PACU - 1ST 60 MINS PHASE I: Performed by: SURGERY

## 2021-10-31 PROCEDURE — A9270 NON-COVERED ITEM OR SERVICE: HCPCS | Performed by: INTERNAL MEDICINE

## 2021-10-31 PROCEDURE — 160042 HCHG SURGERY MINUTES - EA ADDL 1 MIN LEVEL 5: Performed by: SURGERY

## 2021-10-31 PROCEDURE — 160009 HCHG ANES TIME/MIN: Performed by: SURGERY

## 2021-10-31 PROCEDURE — 700102 HCHG RX REV CODE 250 W/ 637 OVERRIDE(OP)

## 2021-10-31 PROCEDURE — 0FT44ZZ RESECTION OF GALLBLADDER, PERCUTANEOUS ENDOSCOPIC APPROACH: ICD-10-PCS | Performed by: SURGERY

## 2021-10-31 PROCEDURE — 83735 ASSAY OF MAGNESIUM: CPT

## 2021-10-31 PROCEDURE — 8E0W4CZ ROBOTIC ASSISTED PROCEDURE OF TRUNK REGION, PERCUTANEOUS ENDOSCOPIC APPROACH: ICD-10-PCS | Performed by: SURGERY

## 2021-10-31 PROCEDURE — A9270 NON-COVERED ITEM OR SERVICE: HCPCS

## 2021-10-31 PROCEDURE — 85025 COMPLETE CBC W/AUTO DIFF WBC: CPT

## 2021-10-31 RX ORDER — OXYCODONE HCL 5 MG/5 ML
10 SOLUTION, ORAL ORAL
Status: DISCONTINUED | OUTPATIENT
Start: 2021-10-31 | End: 2021-10-31 | Stop reason: HOSPADM

## 2021-10-31 RX ORDER — ONDANSETRON 2 MG/ML
4 INJECTION INTRAMUSCULAR; INTRAVENOUS
Status: DISCONTINUED | OUTPATIENT
Start: 2021-10-31 | End: 2021-10-31 | Stop reason: HOSPADM

## 2021-10-31 RX ORDER — ACETAMINOPHEN 500 MG
1000 TABLET ORAL ONCE
Status: COMPLETED | OUTPATIENT
Start: 2021-10-31 | End: 2021-10-31

## 2021-10-31 RX ORDER — CEFAZOLIN SODIUM 1 G/3ML
INJECTION, POWDER, FOR SOLUTION INTRAMUSCULAR; INTRAVENOUS PRN
Status: DISCONTINUED | OUTPATIENT
Start: 2021-10-31 | End: 2021-10-31 | Stop reason: SURG

## 2021-10-31 RX ORDER — LIDOCAINE HYDROCHLORIDE 20 MG/ML
INJECTION, SOLUTION EPIDURAL; INFILTRATION; INTRACAUDAL; PERINEURAL PRN
Status: DISCONTINUED | OUTPATIENT
Start: 2021-10-31 | End: 2021-10-31 | Stop reason: SURG

## 2021-10-31 RX ORDER — HYDRALAZINE HYDROCHLORIDE 20 MG/ML
INJECTION INTRAMUSCULAR; INTRAVENOUS PRN
Status: DISCONTINUED | OUTPATIENT
Start: 2021-10-31 | End: 2021-10-31 | Stop reason: SURG

## 2021-10-31 RX ORDER — NEOSTIGMINE METHYLSULFATE 1 MG/ML
INJECTION, SOLUTION INTRAVENOUS PRN
Status: DISCONTINUED | OUTPATIENT
Start: 2021-10-31 | End: 2021-10-31 | Stop reason: SURG

## 2021-10-31 RX ORDER — SODIUM CHLORIDE, SODIUM LACTATE, POTASSIUM CHLORIDE, CALCIUM CHLORIDE 600; 310; 30; 20 MG/100ML; MG/100ML; MG/100ML; MG/100ML
INJECTION, SOLUTION INTRAVENOUS CONTINUOUS
Status: DISCONTINUED | OUTPATIENT
Start: 2021-10-31 | End: 2021-10-31 | Stop reason: HOSPADM

## 2021-10-31 RX ORDER — DIPHENHYDRAMINE HYDROCHLORIDE 50 MG/ML
12.5 INJECTION INTRAMUSCULAR; INTRAVENOUS
Status: DISCONTINUED | OUTPATIENT
Start: 2021-10-31 | End: 2021-10-31 | Stop reason: HOSPADM

## 2021-10-31 RX ORDER — GLYCOPYRROLATE 0.2 MG/ML
INJECTION INTRAMUSCULAR; INTRAVENOUS PRN
Status: DISCONTINUED | OUTPATIENT
Start: 2021-10-31 | End: 2021-10-31 | Stop reason: SURG

## 2021-10-31 RX ORDER — HYDRALAZINE HYDROCHLORIDE 20 MG/ML
5 INJECTION INTRAMUSCULAR; INTRAVENOUS
Status: DISCONTINUED | OUTPATIENT
Start: 2021-10-31 | End: 2021-10-31 | Stop reason: HOSPADM

## 2021-10-31 RX ORDER — HALOPERIDOL 5 MG/ML
1 INJECTION INTRAMUSCULAR
Status: DISCONTINUED | OUTPATIENT
Start: 2021-10-31 | End: 2021-10-31 | Stop reason: HOSPADM

## 2021-10-31 RX ORDER — GABAPENTIN 300 MG/1
300 CAPSULE ORAL ONCE
Status: COMPLETED | OUTPATIENT
Start: 2021-10-31 | End: 2021-10-31

## 2021-10-31 RX ORDER — KETOROLAC TROMETHAMINE 30 MG/ML
15 INJECTION, SOLUTION INTRAMUSCULAR; INTRAVENOUS ONCE
Status: DISCONTINUED | OUTPATIENT
Start: 2021-10-31 | End: 2021-10-31 | Stop reason: HOSPADM

## 2021-10-31 RX ORDER — ROCURONIUM BROMIDE 10 MG/ML
INJECTION, SOLUTION INTRAVENOUS PRN
Status: DISCONTINUED | OUTPATIENT
Start: 2021-10-31 | End: 2021-10-31 | Stop reason: SURG

## 2021-10-31 RX ORDER — BUPIVACAINE HYDROCHLORIDE AND EPINEPHRINE 5; 5 MG/ML; UG/ML
INJECTION, SOLUTION EPIDURAL; INTRACAUDAL; PERINEURAL
Status: DISCONTINUED | OUTPATIENT
Start: 2021-10-31 | End: 2021-10-31 | Stop reason: HOSPADM

## 2021-10-31 RX ORDER — HYDROMORPHONE HYDROCHLORIDE 1 MG/ML
0.4 INJECTION, SOLUTION INTRAMUSCULAR; INTRAVENOUS; SUBCUTANEOUS
Status: DISCONTINUED | OUTPATIENT
Start: 2021-10-31 | End: 2021-10-31 | Stop reason: HOSPADM

## 2021-10-31 RX ORDER — MIDAZOLAM HYDROCHLORIDE 1 MG/ML
INJECTION INTRAMUSCULAR; INTRAVENOUS PRN
Status: DISCONTINUED | OUTPATIENT
Start: 2021-10-31 | End: 2021-10-31 | Stop reason: SURG

## 2021-10-31 RX ORDER — METOPROLOL TARTRATE 1 MG/ML
1 INJECTION, SOLUTION INTRAVENOUS
Status: DISCONTINUED | OUTPATIENT
Start: 2021-10-31 | End: 2021-10-31 | Stop reason: HOSPADM

## 2021-10-31 RX ORDER — HYDROMORPHONE HYDROCHLORIDE 1 MG/ML
0.2 INJECTION, SOLUTION INTRAMUSCULAR; INTRAVENOUS; SUBCUTANEOUS
Status: DISCONTINUED | OUTPATIENT
Start: 2021-10-31 | End: 2021-10-31 | Stop reason: HOSPADM

## 2021-10-31 RX ORDER — DEXAMETHASONE SODIUM PHOSPHATE 4 MG/ML
INJECTION, SOLUTION INTRA-ARTICULAR; INTRALESIONAL; INTRAMUSCULAR; INTRAVENOUS; SOFT TISSUE PRN
Status: DISCONTINUED | OUTPATIENT
Start: 2021-10-31 | End: 2021-10-31 | Stop reason: SURG

## 2021-10-31 RX ORDER — METOCLOPRAMIDE HYDROCHLORIDE 5 MG/ML
INJECTION INTRAMUSCULAR; INTRAVENOUS PRN
Status: DISCONTINUED | OUTPATIENT
Start: 2021-10-31 | End: 2021-10-31 | Stop reason: SURG

## 2021-10-31 RX ORDER — OXYCODONE HCL 5 MG/5 ML
5 SOLUTION, ORAL ORAL
Status: DISCONTINUED | OUTPATIENT
Start: 2021-10-31 | End: 2021-10-31 | Stop reason: HOSPADM

## 2021-10-31 RX ORDER — HYDROMORPHONE HYDROCHLORIDE 1 MG/ML
0.1 INJECTION, SOLUTION INTRAMUSCULAR; INTRAVENOUS; SUBCUTANEOUS
Status: DISCONTINUED | OUTPATIENT
Start: 2021-10-31 | End: 2021-10-31 | Stop reason: HOSPADM

## 2021-10-31 RX ADMIN — PROPOFOL 100 MG: 10 INJECTION, EMULSION INTRAVENOUS at 07:51

## 2021-10-31 RX ADMIN — Medication 5 MG: at 20:47

## 2021-10-31 RX ADMIN — FENTANYL CITRATE 25 MCG: 50 INJECTION, SOLUTION INTRAMUSCULAR; INTRAVENOUS at 08:21

## 2021-10-31 RX ADMIN — METOCLOPRAMIDE 10 MG: 5 INJECTION, SOLUTION INTRAMUSCULAR; INTRAVENOUS at 08:16

## 2021-10-31 RX ADMIN — CEFAZOLIN 2 G: 330 INJECTION, POWDER, FOR SOLUTION INTRAMUSCULAR; INTRAVENOUS at 08:01

## 2021-10-31 RX ADMIN — ACETAMINOPHEN 1000 MG: 500 TABLET ORAL at 07:35

## 2021-10-31 RX ADMIN — NEOSTIGMINE METHYLSULFATE 2 MG: 1 INJECTION INTRAVENOUS at 09:01

## 2021-10-31 RX ADMIN — QUETIAPINE FUMARATE 50 MG: 25 TABLET ORAL at 20:47

## 2021-10-31 RX ADMIN — FENTANYL CITRATE 25 MCG: 50 INJECTION, SOLUTION INTRAMUSCULAR; INTRAVENOUS at 09:11

## 2021-10-31 RX ADMIN — PROPOFOL 20 MG: 10 INJECTION, EMULSION INTRAVENOUS at 08:22

## 2021-10-31 RX ADMIN — OMEPRAZOLE 20 MG: 20 CAPSULE, DELAYED RELEASE ORAL at 04:21

## 2021-10-31 RX ADMIN — MIDAZOLAM HYDROCHLORIDE 2 MG: 1 INJECTION, SOLUTION INTRAMUSCULAR; INTRAVENOUS at 07:49

## 2021-10-31 RX ADMIN — DEXAMETHASONE SODIUM PHOSPHATE 6 MG: 4 INJECTION, SOLUTION INTRA-ARTICULAR; INTRALESIONAL; INTRAMUSCULAR; INTRAVENOUS; SOFT TISSUE at 08:15

## 2021-10-31 RX ADMIN — FENTANYL CITRATE 25 MCG: 50 INJECTION, SOLUTION INTRAMUSCULAR; INTRAVENOUS at 08:16

## 2021-10-31 RX ADMIN — GABAPENTIN 300 MG: 300 CAPSULE ORAL at 07:35

## 2021-10-31 RX ADMIN — GLYCOPYRROLATE 0.4 MG: 0.2 INJECTION INTRAMUSCULAR; INTRAVENOUS at 09:01

## 2021-10-31 RX ADMIN — PROPOFOL 20 MG: 10 INJECTION, EMULSION INTRAVENOUS at 08:39

## 2021-10-31 RX ADMIN — FENTANYL CITRATE 25 MCG: 50 INJECTION, SOLUTION INTRAMUSCULAR; INTRAVENOUS at 07:50

## 2021-10-31 RX ADMIN — ROCURONIUM BROMIDE 36 MG: 10 INJECTION, SOLUTION INTRAVENOUS at 07:51

## 2021-10-31 RX ADMIN — FENTANYL CITRATE 25 MCG: 50 INJECTION, SOLUTION INTRAMUSCULAR; INTRAVENOUS at 09:03

## 2021-10-31 RX ADMIN — PROPOFOL 20 MG: 10 INJECTION, EMULSION INTRAVENOUS at 08:28

## 2021-10-31 RX ADMIN — PIPERACILLIN AND TAZOBACTAM 3.38 G: 3; .375 INJECTION, POWDER, LYOPHILIZED, FOR SOLUTION INTRAVENOUS; PARENTERAL at 22:47

## 2021-10-31 RX ADMIN — POTASSIUM CHLORIDE 40 MEQ: 1500 TABLET, EXTENDED RELEASE ORAL at 04:21

## 2021-10-31 RX ADMIN — LIDOCAINE HYDROCHLORIDE 50 MG: 20 INJECTION, SOLUTION EPIDURAL; INFILTRATION; INTRACAUDAL at 09:00

## 2021-10-31 RX ADMIN — HYDRALAZINE HYDROCHLORIDE 5 MG: 20 INJECTION INTRAMUSCULAR; INTRAVENOUS at 08:28

## 2021-10-31 RX ADMIN — PROPOFOL 10 MG: 10 INJECTION, EMULSION INTRAVENOUS at 08:37

## 2021-10-31 RX ADMIN — SODIUM CHLORIDE, POTASSIUM CHLORIDE, SODIUM LACTATE AND CALCIUM CHLORIDE: 600; 310; 30; 20 INJECTION, SOLUTION INTRAVENOUS at 20:49

## 2021-10-31 RX ADMIN — FENTANYL CITRATE 50 MCG: 50 INJECTION, SOLUTION INTRAMUSCULAR; INTRAVENOUS at 08:08

## 2021-10-31 RX ADMIN — PIPERACILLIN AND TAZOBACTAM 3.38 G: 3; .375 INJECTION, POWDER, LYOPHILIZED, FOR SOLUTION INTRAVENOUS; PARENTERAL at 15:49

## 2021-10-31 RX ADMIN — FENTANYL CITRATE 25 MCG: 50 INJECTION, SOLUTION INTRAMUSCULAR; INTRAVENOUS at 08:30

## 2021-10-31 RX ADMIN — LIDOCAINE HYDROCHLORIDE 50 MG: 20 INJECTION, SOLUTION EPIDURAL; INFILTRATION; INTRACAUDAL at 07:51

## 2021-10-31 RX ADMIN — FENTANYL CITRATE 50 MCG: 50 INJECTION, SOLUTION INTRAMUSCULAR; INTRAVENOUS at 08:37

## 2021-10-31 ASSESSMENT — ENCOUNTER SYMPTOMS
HEADACHES: 0
ABDOMINAL PAIN: 1
BRUISES/BLEEDS EASILY: 0
INSOMNIA: 0
HEMOPTYSIS: 0
COUGH: 0
BLURRED VISION: 0
CHILLS: 0
PALPITATIONS: 0
MYALGIAS: 0
DIZZINESS: 0
FEVER: 0
DOUBLE VISION: 0
NECK PAIN: 0
POLYDIPSIA: 0

## 2021-10-31 ASSESSMENT — LIFESTYLE VARIABLES: SUBSTANCE_ABUSE: 0

## 2021-10-31 ASSESSMENT — PAIN DESCRIPTION - PAIN TYPE
TYPE: SURGICAL PAIN

## 2021-10-31 ASSESSMENT — FIBROSIS 4 INDEX: FIB4 SCORE: 1.38

## 2021-10-31 NOTE — ANESTHESIA PROCEDURE NOTES
Airway    Date/Time: 10/31/2021 7:53 AM  Performed by: Maurizio Ledesma M.D.  Authorized by: Maurizio Ledesma M.D.     Location:  OR  Urgency:  Elective  Indications for Airway Management:  Anesthesia      Spontaneous Ventilation: absent    Sedation Level:  Deep  Preoxygenated: Yes    Patient Position:  Sniffing  Final Airway Type:  Endotracheal airway  Final Endotracheal Airway:  ETT  Cuffed: Yes    Technique Used for Successful ETT Placement:  Direct laryngoscopy  Devices/Methods Used in Placement:  Intubating stylet    Insertion Site:  Oral  Blade Type:  Bravo  Laryngoscope Blade/Videolaryngoscope Blade Size:  2  ETT Size (mm):  7.0  Measured from:  Teeth  ETT to Teeth (cm):  19  Placement Verified by: auscultation and capnometry    Cormack-Lehane Classification:  Grade I - full view of glottis  Number of Attempts at Approach:  1

## 2021-10-31 NOTE — CONSULTS
General Surgery Consult    CHIEF COMPLAINT: sepsis/AMS.     REFERRING PROVIDER: Dr. Hardwick    HISTORY OF PRESENT ILLNESS: The patient is a 66 y.o. female, who presents with 5-day history of sepsis and altered mental status.  She has history of dementia and therefore unclear neurologic baseline.  She has been worked up over the past 5 days for abdominal pain and sepsis.  She underwent CT imaging 4 days ago which demonstrated a distended gallbladder with an impacted stone within the cystic duct neck and subsequently underwent HIDA scan 2 days ago which showed evidence of cystic duct obstruction.  Surgery was consulted for consideration of cholecystectomy.  Patient does feel relief currently.  She appears to have good cognitive function and does interact well during the interview and answer questions appropriately.  She does have some trouble remembering surgery she had in the distant past.  She does not recall any medical history to me and per the primary team her family has been unable to be reached.  She does seem appropriate to me and understands her disease process and the treatments that are recommended.  Patient is currently eating dinner    PAST MEDICAL HISTORY:   unknown and family unable to be reached    PAST SURGICAL HISTORY: unknown remote surgery, doesn't think it was in abdomen     ALLERGIES: No Known Allergies     CURRENT MEDICATIONS:   Home Medications     Reviewed by Clement Richards (Pharmacy Tech) on 10/26/21 at 1049  Med List Status: Complete   Medication Last Dose Status        Patient Thompson Taking any Medications                       FAMILY HISTORY: No family history on file.     SOCIAL HISTORY:   Social History     Tobacco Use   • Smoking status: Former Smoker   • Smokeless tobacco: Never Used   Vaping Use   • Vaping Use: Never used   Substance and Sexual Activity   • Alcohol use: Not Currently   • Drug use: Not Currently   • Sexual activity: Not on file       REVIEW OF SYSTEMS:  "Comprehensive review of systems was negative aside from resolved abdominal pain and nausea, diarrhea    PHYSICAL EXAMINATION:       VITAL SIGNS: /66   Pulse 64   Temp 36.7 °C (98 °F) (Temporal)   Resp 16   Ht 1.499 m (4' 11.02\")   Wt 58.7 kg (129 lb 6.6 oz)   SpO2 96%   GENERAL: The patient is awake and alert, currently eating dinner, appears well  HEENT:  MMM. Neck supple. No adenopathy.  PULM:No respiratory distress.  CARDIOVASCULAR: Regular rate. The extremities are well perfused.   ABDOMEN: Soft, nondistended, mildly tender to palpation in the right upper quadrant  EXTREMITIES: Examination of the upper and lower extremities demonstrates no cyanosis edema or clubbing.  NEUROLOGIC: Alert & oriented x 3, Normal motor function, Normal sensory function, No focal deficits noted.    LABORATORY VALUES:   Recent Labs     10/29/21  0146 10/29/21  1003 10/30/21  0212   WBC 8.9 8.6 8.6   RBC 3.52* 4.06* 3.86*   HEMOGLOBIN 10.7* 12.1 11.6*   HEMATOCRIT 31.5* 36.2* 35.3*   MCV 89.5 89.2 91.5   MCH 30.4 29.8 30.1   MCHC 34.0 33.4* 32.9*   RDW 44.7 44.8 47.0   PLATELETCT 218 244 256   MPV 10.1 9.7 9.7     Recent Labs     10/28/21  0112 10/29/21  0146 10/30/21  0212   SODIUM 136 139 140   POTASSIUM 3.4* 3.6 4.0   CHLORIDE 102 105 108   CO2 24 23 22   GLUCOSE 101* 74 114*   BUN 7* 4* 7*   CREATININE 0.58 0.54 0.68   CALCIUM 8.1* 8.3* 8.8     Recent Labs     10/30/21  0212   ASTSGOT 27   ALTSGPT 23   TBILIRUBIN 0.3   ALKPHOSPHAT 97   GLOBULIN 2.9            IMAGING:   NM-BILIARY (HIDA) SCAN WITH CCK   Final Result      Motion degraded exam due to patient noncooperation. No filling of the gallbladder seen, consistent with cystic duct obstruction.      CT-ABDOMEN-PELVIS WITH   Final Result      1.  Gallstone in the gallbladder neck with distended gallbladder with gallbladder wall thickening and pericholecystic fluid consistent with acute cholecystitis.      2.  Inflammation in the pelvis which appears centered around the " uterus and adnexa versus the sigmoid colon could indicate PID or perhaps represent inflammation tracking down from the gallbladder.      3.  Diverticulosis without definite evidence of diverticulitis.      US-RUQ   Final Result         1.  Fixed stone at the gallbladder neck with gallbladder distention and gallbladder filled with sludge with positive sonographic Noble's sign, however normal gallbladder wall thickness. Findings concerning but insufficient for diagnosis of    cholecystitis. Could be further evaluated with HIDA scan.   2.  Hepatomegaly and echogenic liver, compatible with fatty change versus fibrosis.   3.  Atherosclerosis      NS-[id number]-3 VIEW   Final Result         1. Nonobstructive bowel gas pattern.   2. Centrally lucent gallstone is present.      CT-HEAD W/O   Final Result         1.  No acute intracranial abnormality is identified, there are nonspecific white matter changes, commonly associated with small vessel ischemic disease.  Associated mild cerebral atrophy is noted.   2.  Right maxillary sinus mucus retention cyst   3.  Atherosclerosis.      DX-CHEST-PORTABLE (1 VIEW)   Final Result         1.  No acute cardiopulmonary disease.   2.  Round calcification in the right upper quadrant, appearance suggests calcified gallstone.   3.  Atherosclerosis          IMPRESSION AND PLAN:   65 y/o female admitted to hospital 5 days ago with sepsis and AMS, determined to have distended gallbladder with impacted gallstone within the neck by CT imaging and +HIDA for cystic duct obstruction done two days ago. Patient currently managed with abx and has had resolution of her pain for the most part. Cholecystectomy is indicated  1. Appreciate medical care  2. dvt prophylaxis  3. NPOpMN  4. To OR in am for laparoscopic possible open cholecystectomy  5. Patient understands that she is at higher risk for open procedure because of the time that she has been in the hospital and had sepsis/cholecystitis and she is  agreeable with proceeding    1.  The patient will be taken to the operating room for laparoscopic possible open cholecystectomy. The surgical conduct was explained. Potential complications including but not limited to infection, bleeding, damage to adjacent structures, anesthetic complications were discussed in detail. Questions were elicited and answered to her satisfaction. She understands the rationale for surgery and elects to proceed.  Operative consent signed.      Jori Wills MD    ___________________________________   Jori Wills M.D.    DD: 10/30/2021 DT: 6:24 PM

## 2021-10-31 NOTE — ANESTHESIA PREPROCEDURE EVALUATION
Altered mental status- ? dementia  sepsis  Relevant Problems   No relevant active problems       Physical Exam    Airway   Mallampati: II  TM distance: >3 FB  Neck ROM: full       Cardiovascular - normal exam  Rhythm: regular  Rate: normal  (-) murmur     Dental - normal exam           Pulmonary - normal exam  Breath sounds clear to auscultation     Abdominal    Neurological - normal exam         Other findings: Missing some teeth            Anesthesia Plan    ASA 3   ASA physical status 3 criteria: other (comment)    Plan - general       Airway plan will be ETT    (Recent sepsis, altered mental status)      Induction: intravenous    Postoperative Plan: Postoperative administration of opioids is intended.    Pertinent diagnostic labs and testing reviewed    Informed Consent:    Anesthetic plan and risks discussed with patient.    Use of blood products discussed with: patient whom consented to blood products.

## 2021-10-31 NOTE — OP REPORT
OP Note    PreOp Diagnosis: acute cholecystitis      PostOp Diagnosis: acute cholecystitis      Procedure(s):  CHOLECYSTECTOMY, ROBOT-ASSISTED, USING DA NALLELY XI - Wound Class: Dirty or Infected    Surgeon(s):  Jori Wills M.D.    Assistant(s):   none    Anesthesiologist/Type of Anesthesia:  Anesthesiologist: Maurizio Ledesma M.D./General    Surgical Staff:  Circulator: Venkat Lovell R.N.  Scrub Person: Ana Rosa DOWNEY Regino    Specimens removed if any:  ID Type Source Tests Collected by Time Destination   A : Gallbladder Tissue Gallbladder PATHOLOGY SPECIMEN Jori Wills M.D. 10/31/2021  8:58 AM        Estimated Blood Loss: 100cc    Findings: significant inflammation of gallbladder and surrounding tissues    Complications: none observed          DESCRIPTION OF PROCEDURE:  The patient was met in the preoperative holding area where all of the potential risks and benefits of the procedure were discussed in detail with the patient. All of her and her sisters questions were answered to their satisfaction and informed consent was signed. The patient was taken back to the operating room and placed supine on the operating room table. General endotracheal anesthesia was induced and all of the patients pressure points were padded appropriately. The patients abdomen was prepped and draped in the usual sterile fashion.  A timeout was performed which correctly identified the patient and the procedure being performed and preoperative antibiotics were given according to SCIP guidelines.     The operation was begun with infiltration of 0.5% marcaine with epinephrine into the left mid abdominal wall skin. A small incision was created and the verress needle placed through the abdominal wall and into the abdominal cavity. Insufflation was carried out to 15mmHg and an 8mm robotic trochar  was introduced through this incision and into the abdominal cavity. A brief survey of the abdominal cavity demonstrated  omental adhesions in the RUQ as well as a distended gallbladder with significant inflammatory reaction in the surrounding tissue. 3 additional robotic trochars were then placed in a horizontal configuration across the mid abdomen after the skin and peritoneum were infiltrated with 0.5% marcaine with epinephrine. The gallbladder was located in its right upper quadrant position and retracted superiorly with the grasping retractor.  The contents of the hepatocystic triangle were dissected clearly free. Once the critical view of safety was obtained, the cystic duct was triply clipped on the common duct side and singly clipped on the gallbladder side and then transected. The cystic artery was dealt in a similar fashion. Electrocautery was used to remove the gallbladder from its attachments to the liver bed.     The right upper quadrant was copiously irrigated until clear. The clips were in place without biliary spillage at the end of the case. Hemostasis was meticulously achieved.  I then retrieved the specimen via an Endo Catch device.  A 0 Vicryl suture was used to reapproximate the fascia of the extraction trochar in the left mid abdomen. Monocryl was used to reapproximate skin edges. Dermabond was applied as a dressing.     The patient tolerated the procedure well and was extubated at the conclusion of the case without incident. All of the sponge, needle and instrument counts were correct at the conclusion of the case. After she was awoken from anesthesia she was taken to the recovery room in stable condition.          10/31/2021 9:02 AM Jori Wills M.D.

## 2021-10-31 NOTE — ANESTHESIA POSTPROCEDURE EVALUATION
Patient: Dottie Enamorado    Procedure Summary     Date: 10/31/21 Room / Location: [place] [address] / SURGERY [facility]    Anesthesia Start: 0743 Anesthesia Stop: 0914    Procedure: CHOLECYSTECTOMY, ROBOT-ASSISTED, USING DA NALLELY XI (Abdomen) Diagnosis: (Cholecystitis)    Surgeons: Jori Wills M.D. Responsible Provider: Maurizio Ledesma M.D.    Anesthesia Type: general ASA Status: 3          Final Anesthesia Type: general  Last vitals  BP   Blood Pressure : 150/72    Temp   36.5 °C (97.7 °F)    Pulse   71   Resp   18    SpO2   91 %      Anesthesia Post Evaluation    Patient location during evaluation: PACU  Patient participation: complete - patient participated  Level of consciousness: awake and alert    Airway patency: patent  Anesthetic complications: no  Cardiovascular status: hemodynamically stable  Respiratory status: acceptable  Hydration status: euvolemic    PONV: none          No complications documented.     Nurse Pain Score: 0 (NPRS)

## 2021-10-31 NOTE — ANESTHESIA TIME REPORT
Anesthesia Start and Stop Event Times     Date Time Event    10/31/2021 0734 Ready for Procedure     0743 Anesthesia Start     0914 Anesthesia Stop        Responsible Staff  10/31/21    Name Role Begin End    Maurizio Ledesma M.D. Anesth 0743 0914        Preop Diagnosis (Free Text):  Pre-op Diagnosis     Cholecystitis        Preop Diagnosis (Codes):    Premium Reason  E. Weekend    Comments:

## 2021-10-31 NOTE — PROGRESS NOTES
Daily Progress Note:     Date of Service: 10/31/2021  Primary Team: [facility] Team   Attending: Dr. Hardwick   Senior Resident: Dr. Huynh  Intern: Dr. Dc  Contact:  [phone]    Chief Complaint:  Abdominal pain    ID:  Ms. Enamorado is a 67 yo F with unknown PMHx 2/2 to AMS admitted to floor as a transfer from urgent care where she presented yesterday (10/25/2021) for abd pain.    Subjective:  No acute overnight events.  Pt underwent laparascopic tiburcio this morning and tolerated procedure well.  Pt is somnolent during patient encounter but in no acute distress.    Interval History:  CBC WBC 9.3 Hgb 11.4. Postassium 3.7 repleted. Mg 2.0.  Underwent laparoscopic cholecystectomy in AM.     Consultants/Specialty:  General Surgery    Review of Systems:    Review of Systems   Constitutional: Negative for chills and fever.   HENT: Negative for hearing loss and tinnitus.    Eyes: Negative for blurred vision and double vision.   Respiratory: Negative for cough and hemoptysis.    Cardiovascular: Negative for chest pain and palpitations.   Gastrointestinal: Positive for abdominal pain (RUQ).   Genitourinary: Negative for dysuria and urgency.   Musculoskeletal: Negative for myalgias and neck pain.   Skin: Negative for itching and rash.   Neurological: Negative for dizziness and headaches.   Endo/Heme/Allergies: Negative for polydipsia. Does not bruise/bleed easily.   Psychiatric/Behavioral: Negative for substance abuse. The patient does not have insomnia.        Objective Data:   Physical Exam:   Vitals:   Temp:  [36.2 °C (97.2 °F)-37.2 °C (99 °F)] 36.5 °C (97.7 °F)  Pulse:  [47-88] 71  Resp:  [16-18] 18  BP: (121-169)/(60-89) 150/72  SpO2:  [91 %-100 %] 91 %    Physical Exam  Vitals and nursing note reviewed.   Constitutional:       Appearance: She is normal weight. She is not toxic-appearing.   HENT:      Head: Normocephalic and atraumatic.      Right Ear: Tympanic membrane normal. There is no impacted cerumen.       Left Ear: Tympanic membrane normal. There is no impacted cerumen.      Nose: Nose normal. No congestion or rhinorrhea.      Mouth/Throat:      Mouth: Mucous membranes are moist.      Pharynx: Oropharynx is clear. No oropharyngeal exudate or posterior oropharyngeal erythema.   Eyes:      General: No scleral icterus.        Right eye: No discharge.         Left eye: No discharge.      Extraocular Movements: Extraocular movements intact.      Conjunctiva/sclera: Conjunctivae normal.      Pupils: Pupils are equal, round, and reactive to light.   Cardiovascular:      Rate and Rhythm: Normal rate and regular rhythm.      Pulses: Normal pulses.      Heart sounds: Normal heart sounds. No murmur heard.   No friction rub. No gallop.    Pulmonary:      Effort: Pulmonary effort is normal. No respiratory distress.      Breath sounds: Normal breath sounds. No wheezing, rhonchi or rales.   Abdominal:      Tenderness: There is abdominal tenderness (s/p laparascopic cholecystectomy).      Hernia: No hernia is present.   Musculoskeletal:         General: No swelling, tenderness or deformity. Normal range of motion.      Cervical back: Normal range of motion and neck supple. No rigidity or tenderness.   Skin:     General: Skin is warm and dry.      Capillary Refill: Capillary refill takes less than 2 seconds.      Coloration: Skin is not jaundiced.      Findings: No erythema.   Neurological:      Mental Status: She is alert.      Cranial Nerves: No cranial nerve deficit.      Sensory: No sensory deficit.      Coordination: Coordination normal.      Comments: Oriented to self only.  Somnolent from effects of anesthetics.           Labs:   Recent Results (from the past 24 hour(s))   CoV, Flu A/B RAPID ANTIGEN: Collect one dry nasal swab    Collection Time: 10/30/21 11:55 PM    Specimen: Nasopharyngeal; Respirate   Result Value Ref Range    Influenza A AG by MIGUEL Negative Negative    Influenza B AG by MIGUEL Negative Negative    SARS-COV  ANTIGEN MIGUEL NotDetected Not-Detected    SARS-CoV-2 Source Nasal Swab    Basic Metabolic Panel    Collection Time: 10/31/21  2:06 AM   Result Value Ref Range    Sodium 140 135 - 145 mmol/L    Potassium 3.7 3.6 - 5.5 mmol/L    Chloride 109 96 - 112 mmol/L    Co2 23 20 - 33 mmol/L    Glucose 104 (H) 65 - 99 mg/dL    Bun 9 8 - 22 mg/dL    Creatinine 0.62 0.50 - 1.40 mg/dL    Calcium 8.5 8.5 - 10.5 mg/dL    Anion Gap 8.0 7.0 - 16.0   CBC WITH DIFFERENTIAL    Collection Time: 10/31/21  2:06 AM   Result Value Ref Range    WBC 9.3 4.8 - 10.8 K/uL    RBC 3.86 (L) 4.20 - 5.40 M/uL    Hemoglobin 11.4 (L) 12.0 - 16.0 g/dL    Hematocrit 35.0 (L) 37.0 - 47.0 %    MCV 90.7 81.4 - 97.8 fL    MCH 29.5 27.0 - 33.0 pg    MCHC 32.6 (L) 33.6 - 35.0 g/dL    RDW 47.6 35.9 - 50.0 fL    Platelet Count 278 164 - 446 K/uL    MPV 9.5 9.0 - 12.9 fL    Neutrophils-Polys 62.10 44.00 - 72.00 %    Lymphocytes 26.20 22.00 - 41.00 %    Monocytes 6.30 0.00 - 13.40 %    Eosinophils 3.10 0.00 - 6.90 %    Basophils 0.50 0.00 - 1.80 %    Immature Granulocytes 1.80 (H) 0.00 - 0.90 %    Nucleated RBC 0.00 /100 WBC    Neutrophils (Absolute) 5.76 2.00 - 7.15 K/uL    Lymphs (Absolute) 2.44 1.00 - 4.80 K/uL    Monos (Absolute) 0.59 0.00 - 0.85 K/uL    Eos (Absolute) 0.29 0.00 - 0.51 K/uL    Baso (Absolute) 0.05 0.00 - 0.12 K/uL    Immature Granulocytes (abs) 0.17 (H) 0.00 - 0.11 K/uL    NRBC (Absolute) 0.00 K/uL   MAGNESIUM    Collection Time: 10/31/21  2:06 AM   Result Value Ref Range    Magnesium 2.0 1.5 - 2.5 mg/dL   ESTIMATED GFR    Collection Time: 10/31/21  2:06 AM   Result Value Ref Range    GFR If African American >60 >60 mL/min/1.73 m 2    GFR If Non African American >60 >60 mL/min/1.73 m 2       Imaging:   Independant Imaging Review: Completed  NM-BILIARY (HIDA) SCAN WITH CCK   Final Result      Motion degraded exam due to patient noncooperation. No filling of the gallbladder seen, consistent with cystic duct obstruction.      CT-ABDOMEN-PELVIS WITH    Final Result      1.  Gallstone in the gallbladder neck with distended gallbladder with gallbladder wall thickening and pericholecystic fluid consistent with acute cholecystitis.      2.  Inflammation in the pelvis which appears centered around the uterus and adnexa versus the sigmoid colon could indicate PID or perhaps represent inflammation tracking down from the gallbladder.      3.  Diverticulosis without definite evidence of diverticulitis.      US-RUQ   Final Result         1.  Fixed stone at the gallbladder neck with gallbladder distention and gallbladder filled with sludge with positive sonographic Noble's sign, however normal gallbladder wall thickness. Findings concerning but insufficient for diagnosis of    cholecystitis. Could be further evaluated with HIDA scan.   2.  Hepatomegaly and echogenic liver, compatible with fatty change versus fibrosis.   3.  Atherosclerosis      [id number] VIEW   Final Result         1. Nonobstructive bowel gas pattern.   2. Centrally lucent gallstone is present.      CT-HEAD W/O   Final Result         1.  No acute intracranial abnormality is identified, there are nonspecific white matter changes, commonly associated with small vessel ischemic disease.  Associated mild cerebral atrophy is noted.   2.  Right maxillary sinus mucus retention cyst   3.  Atherosclerosis.      DX-CHEST-PORTABLE (1 VIEW)   Final Result         1.  No acute cardiopulmonary disease.   2.  Round calcification in the right upper quadrant, appearance suggests calcified gallstone.   3.  Atherosclerosis          Problem Representation:        Cholecystitis- (present on admission)  Assessment & Plan  -  Acute calculous cholecystitis  -  Radiologic findings consistent with acute cholecystitis in three imaging modalities  - US showed: Fixed stone at the gallbladder neck with gallbladder distention and gallbladder filled with sludge with positive sonographic Noble's sign, however normal gallbladder wall  thickness.   - HIDA scan>No filling of the gallbladder consistent with cystic duct obstruction  - CT scan abdomen was also obtained showed distended gallbladder and pericholecystic fluid suggestive of cholecystitis  - LR. Zosyn Q8 hours until 11/5.  - Surgery consulted on 10/28/2021.  Recommend NPO at midnight 10/30/21. Laparoscopic cholecystectomy performed on AM 10/31.    Sepsis (HCC)- (present on admission)  Assessment & Plan  This is Sepsis Present on admission  SIRS criteria identified on my evaluation include:Tachycarida Leukocytosis, with WBC greater than 12,000  Source is Gallbladder.   Sepsis protocol initiated  Fluid resuscitation ordered per protocol  IV antibiotics as appropriate for source of sepsis  While organ dysfunction may be noted elsewhere in this problem list or in the chart, degree of organ dysfunction does not meet CMS criteria for severe sepsis  - source removed laparoscopically on 10/31.      Altered mental state  Assessment & Plan  - pt was alert and oriented to person only   -Intermittently confusion suggested with resolving/agitation.  -Delirium suspected. 50 mg Seroquel at bedtime ordered.      Tubes: none  Lines: pIV (RUE)  Drains: none  Fluids: LR  Diet: Regular  DVT prophylaxis: enoxaparin  GI prophylaxis: none  Antibiotics: Zosyn  Code Status: FULL  Disposition: ON Zosyn

## 2021-10-31 NOTE — OR NURSING
Pt oriented to name, date of birth and city, disoriented to place, and reason for hospitalization, pt is pleasant and cooperative, 2 RN telephone consent obtained from pt sister, Alisia French,

## 2021-10-31 NOTE — OR NURSING
Arrived to PACU in stable condition. Denies pain or nausea at this time. Cold pack placed to site. Resting comfortably.     1015 Denies pain and is stable to transfer to her room. Appropriate responses and wants to sleep. Site CDI. VSS

## 2021-11-01 VITALS
WEIGHT: 131.39 LBS | BODY MASS INDEX: 26.49 KG/M2 | OXYGEN SATURATION: 95 % | HEIGHT: 59 IN | SYSTOLIC BLOOD PRESSURE: 159 MMHG | HEART RATE: 63 BPM | TEMPERATURE: 97.3 F | RESPIRATION RATE: 18 BRPM | DIASTOLIC BLOOD PRESSURE: 78 MMHG

## 2021-11-01 LAB
ANION GAP SERPL CALC-SCNC: 11 MMOL/L (ref 7–16)
BACTERIA BLD CULT: NORMAL
BACTERIA BLD CULT: NORMAL
BACTERIA STL CULT: NORMAL
BASOPHILS # BLD AUTO: 0.4 % (ref 0–1.8)
BASOPHILS # BLD: 0.04 K/UL (ref 0–0.12)
BUN SERPL-MCNC: 7 MG/DL (ref 8–22)
C JEJUNI+C COLI AG STL QL: NORMAL
CALCIUM SERPL-MCNC: 8.7 MG/DL (ref 8.5–10.5)
CHLORIDE SERPL-SCNC: 105 MMOL/L (ref 96–112)
CO2 SERPL-SCNC: 23 MMOL/L (ref 20–33)
CREAT SERPL-MCNC: 0.62 MG/DL (ref 0.5–1.4)
EOSINOPHIL # BLD AUTO: 0.01 K/UL (ref 0–0.51)
EOSINOPHIL NFR BLD: 0.1 % (ref 0–6.9)
ERYTHROCYTE [DISTWIDTH] IN BLOOD BY AUTOMATED COUNT: 46.5 FL (ref 35.9–50)
GLUCOSE SERPL-MCNC: 97 MG/DL (ref 65–99)
HCT VFR BLD AUTO: 35 % (ref 37–47)
HGB BLD-MCNC: 11.5 G/DL (ref 12–16)
IMM GRANULOCYTES # BLD AUTO: 0.16 K/UL (ref 0–0.11)
IMM GRANULOCYTES NFR BLD AUTO: 1.4 % (ref 0–0.9)
LYMPHOCYTES # BLD AUTO: 2.1 K/UL (ref 1–4.8)
LYMPHOCYTES NFR BLD: 19 % (ref 22–41)
MAGNESIUM SERPL-MCNC: 1.9 MG/DL (ref 1.5–2.5)
MCH RBC QN AUTO: 29.7 PG (ref 27–33)
MCHC RBC AUTO-ENTMCNC: 32.9 G/DL (ref 33.6–35)
MCV RBC AUTO: 90.4 FL (ref 81.4–97.8)
MONOCYTES # BLD AUTO: 0.59 K/UL (ref 0–0.85)
MONOCYTES NFR BLD AUTO: 5.3 % (ref 0–13.4)
NEUTROPHILS # BLD AUTO: 8.15 K/UL (ref 2–7.15)
NEUTROPHILS NFR BLD: 73.8 % (ref 44–72)
NRBC # BLD AUTO: 0 K/UL
NRBC BLD-RTO: 0 /100 WBC
PATHOLOGY CONSULT NOTE: NORMAL
PLATELET # BLD AUTO: 360 K/UL (ref 164–446)
PMV BLD AUTO: 9.1 FL (ref 9–12.9)
POTASSIUM SERPL-SCNC: 3.9 MMOL/L (ref 3.6–5.5)
RBC # BLD AUTO: 3.87 M/UL (ref 4.2–5.4)
SIGNIFICANT IND 70042: NORMAL
SITE SITE: NORMAL
SODIUM SERPL-SCNC: 139 MMOL/L (ref 135–145)
SOURCE SOURCE: NORMAL
WBC # BLD AUTO: 11.1 K/UL (ref 4.8–10.8)

## 2021-11-01 PROCEDURE — 36415 COLL VENOUS BLD VENIPUNCTURE: CPT

## 2021-11-01 PROCEDURE — 83735 ASSAY OF MAGNESIUM: CPT

## 2021-11-01 PROCEDURE — 700105 HCHG RX REV CODE 258: Performed by: INTERNAL MEDICINE

## 2021-11-01 PROCEDURE — 700102 HCHG RX REV CODE 250 W/ 637 OVERRIDE(OP): Performed by: STUDENT IN AN ORGANIZED HEALTH CARE EDUCATION/TRAINING PROGRAM

## 2021-11-01 PROCEDURE — A9270 NON-COVERED ITEM OR SERVICE: HCPCS | Performed by: STUDENT IN AN ORGANIZED HEALTH CARE EDUCATION/TRAINING PROGRAM

## 2021-11-01 PROCEDURE — 700102 HCHG RX REV CODE 250 W/ 637 OVERRIDE(OP)

## 2021-11-01 PROCEDURE — 85025 COMPLETE CBC W/AUTO DIFF WBC: CPT

## 2021-11-01 PROCEDURE — 700111 HCHG RX REV CODE 636 W/ 250 OVERRIDE (IP): Performed by: STUDENT IN AN ORGANIZED HEALTH CARE EDUCATION/TRAINING PROGRAM

## 2021-11-01 PROCEDURE — 80048 BASIC METABOLIC PNL TOTAL CA: CPT

## 2021-11-01 PROCEDURE — 700111 HCHG RX REV CODE 636 W/ 250 OVERRIDE (IP): Performed by: INTERNAL MEDICINE

## 2021-11-01 PROCEDURE — A9270 NON-COVERED ITEM OR SERVICE: HCPCS

## 2021-11-01 PROCEDURE — 99024 POSTOP FOLLOW-UP VISIT: CPT | Performed by: SURGERY

## 2021-11-01 PROCEDURE — 99239 HOSP IP/OBS DSCHRG MGMT >30: CPT | Performed by: HOSPITALIST

## 2021-11-01 RX ADMIN — PIPERACILLIN AND TAZOBACTAM 3.38 G: 3; .375 INJECTION, POWDER, LYOPHILIZED, FOR SOLUTION INTRAVENOUS; PARENTERAL at 09:26

## 2021-11-01 RX ADMIN — POTASSIUM CHLORIDE 40 MEQ: 1500 TABLET, EXTENDED RELEASE ORAL at 05:50

## 2021-11-01 RX ADMIN — OMEPRAZOLE 20 MG: 20 CAPSULE, DELAYED RELEASE ORAL at 05:50

## 2021-11-01 RX ADMIN — ENOXAPARIN SODIUM 40 MG: 40 INJECTION SUBCUTANEOUS at 05:50

## 2021-11-01 NOTE — PROGRESS NOTES
"ACUTE CARE SURGERY [facility] TEAM    Postop day 1 after laparoscopic cholecystectomy for acute cholecystitis  Up to chair  Taking some clears this morning    /78   Pulse 63   Temp 36.3 °C (97.3 °F) (Temporal)   Resp 18   Ht 1.499 m (4' 11.02\")   Wt 59.6 kg (131 lb 6.3 oz)   SpO2 95%   BMI 26.54 kg/m²     NAD  Abdomen soft, nontender  Incisions intact    Recent Labs     10/30/21  0212 10/31/21  0206 11/01/21  0123   WBC 8.6 9.3 11.1*   RBC 3.86* 3.86* 3.87*   HEMOGLOBIN 11.6* 11.4* 11.5*   HEMATOCRIT 35.3* 35.0* 35.0*   MCV 91.5 90.7 90.4   MCH 30.1 29.5 29.7   RDW 47.0 47.6 46.5   PLATELETCT 256 278 360   MPV 9.7 9.5 9.1   NEUTSPOLYS 59.00 62.10 73.80*   LYMPHOCYTES 29.60 26.20 19.00*   MONOCYTES 7.40 6.30 5.30   EOSINOPHILS 1.90 3.10 0.10   BASOPHILS 0.60 0.50 0.40       Recent Labs     10/30/21  0212   ASTSGOT 27   ALTSGPT 23   TBILIRUBIN 0.3   ALKPHOSPHAT 97   GLOBULIN 2.9   '  Assessment and plan:  Patient appears to be doing well after laparoscopic cholecystectomy  Advance diet as tolerated  Discharge plan per medical service  "

## 2021-11-01 NOTE — DISCHARGE INSTRUCTIONS
Discharge Instructions    Discharged to home by car with relative. Discharged via wheelchair, hospital escort: Yes.  Special equipment needed: Not Applicable    Be sure to schedule a follow-up appointment with your primary care doctor or any specialists as instructed.     Discharge Plan:   Diet Plan: Discussed  Activity Level: Discussed  Confirmed Follow up Appointment: Patient to Call and Schedule Appointment  Confirmed Symptoms Management: Discussed  Medication Reconciliation Updated: Yes    I understand that a diet low in cholesterol, fat, and sodium is recommended for good health. Unless I have been given specific instructions below for another diet, I accept this instruction as my diet prescription.   Other diet: Regular    Special Instructions: None    · Is patient discharged on Warfarin / Coumadin?   No     Depression / Suicide Risk    As you are discharged from this [facility] facility, it is important to learn how to keep safe from harming yourself.    Recognize the warning signs:  · Abrupt changes in personality, positive or negative- including increase in energy   · Giving away possessions  · Change in eating patterns- significant weight changes-  positive or negative  · Change in sleeping patterns- unable to sleep or sleeping all the time   · Unwillingness or inability to communicate  · Depression  · Unusual sadness, discouragement and loneliness  · Talk of wanting to die  · Neglect of personal appearance   · Rebelliousness- reckless behavior  · Withdrawal from people/activities they love  · Confusion- inability to concentrate     If you or a loved one observes any of these behaviors or has concerns about self-harm, here's what you can do:  · Talk about it- your feelings and reasons for harming yourself  · Remove any means that you might use to hurt yourself (examples: pills, rope, extension cords, firearm)  · Get professional help from the community (Mental Health, Substance Abuse, psychological  counseling)  · Do not be alone:Call your Safe Contact- someone whom you trust who will be there for you.  · Call your local CRISIS HOTLINE 434-1071 or 095-665-1433  · Call your local Children's Mobile Crisis Response Team [place] (391) 408-3366 or www.[employer]  · Call the toll free National Suicide Prevention Hotlines   · National Suicide Prevention Lifeline 191-593-PVYK (0035)  · National Hope Line Network 800-SUICIDE (430-2938)

## 2021-11-01 NOTE — DISCHARGE PLANNING
Anticipated Discharge Disposition: Home    Action: Discussed pt in IDT rounds. Pt is medically clear to DC home. LSW made phone call to pt's niece Yasmin and informed her that the pt is medically clear to go home today. Yasmin reported she will be on her way to the hospital soon to  the pt.    Addendum @[id number]  SANTO Clarke notified this LSW that the niece has still not come to  the pt. LSW made phone call to Yasmin, no answer, left VM. LSW made phone call to pt's sister, Alisia. Alisia reported that she lives in [place] and has not heard from Yasmin. Pt lives with Yasmin in [place]. Alisia reported she will try to get a hold of Yasmin.    Barriers to Discharge: none.    Plan: No DC needs identified at this time.

## 2021-11-01 NOTE — PROGRESS NOTES
Assumed care of pt. Report given by SANTO Butler. Plan of care discussed. Oriented to self and place. Call light and personal belongings in reach. Patient sitting in chair, chair alarm on and working properly. No other needs at this time.

## 2021-11-01 NOTE — DISCHARGE SUMMARY
"Discharge Summary    Date of Admission: 10/25/2021  Date of Discharge: 11/1/2021   Discharging Attending: Cristopher Du M.D.   Discharging Senior Resident: Dr. Huynh  Discharging Intern: Dr. Zhou    CHIEF COMPLAINT ON ADMISSION  Chief Complaint   Patient presents with   • ALOC     BIB EMS from  for ALOC. Pt AOx2, alert to person and situation. Pt states \"I'm sick with dementia.\"       Reason for Admission  Sepsis  Altered menta status  Abdominal pain    Admission Date  10/25/2021    CODE STATUS  Full Code    HPI & HOSPITAL COURSE  66 years old female patient with past medical history of untreated dementia presents to the hospital on 10/25 2021 with altered mental status associated with abdominal pain.  She was found to be septic on presentation to the ER, with white blood count of 38.2 , lactic acid of 3.9.  CT of the head did not show acute intracranial abnormalities.  Chest x-ray did not show acute cardiopulmonary abnormalities.  Patient was then admitted to the medical floor for further management, started on IV fluid resuscitation and antibiotics (Zosyn).  Right upper quadrant ultrasound was obtained that showed 6 stone at the gallbladder neck with gallbladder distention and gallbladder filled with sludge with positive sonographic Noble sign with normal gallbladder wall thickness associated with fatty liver changes.  CT abdomen pelvis did show stone in the gallbladder neck with distended bladder, gallbladder wall thickening and pericholecystic fluid consistent with acute cholecystitis.  HIDA scan was also obtained history of cystic duct obstruction.  Blood cultures grew beta-hemolytic Streptococcus group A in 1/2 bottles which was thought secondary to contamination.  Surgery was consulted and the patient underwent laparoscopic cholecystectomy on 10/31/2021 without any immediate complications.  The patient tolerated the procedure very well.  She  tolerated the diet very well and had a normal bowel " movement prior to discharge.  The patient presented status improved to baseline (alert& oriented x 1-2).  Seroquel was also added secondary to delirium which was thought secondary to sepsis.  Seroquel was discontinued upon discharge.  The patient completed 1 week course of Zosyn    Therefore, she is discharged in fair and stable condition to home with close outpatient follow-up.    The patient met 2-midnight criteria for an inpatient stay at the time of discharge.    PHYSICAL EXAM ON DISCHARGE  Temp:  [36.3 °C (97.3 °F)-36.8 °C (98.2 °F)] 36.3 °C (97.3 °F)  Pulse:  [63-89] 63  Resp:  [16-18] 18  BP: (140-171)/(74-81) 159/78  SpO2:  [92 %-96 %] 95 %    Physical Exam  Constitutional:       General: She is not in acute distress.     Appearance: She is not ill-appearing, toxic-appearing or diaphoretic.      Comments: Oriented x1   HENT:      Head: Normocephalic and atraumatic.      Nose: Nose normal.   Eyes:      Pupils: Pupils are equal, round, and reactive to light.   Cardiovascular:      Rate and Rhythm: Normal rate and regular rhythm.      Pulses: Normal pulses.      Heart sounds: No murmur heard.   No friction rub. No gallop.    Pulmonary:      Effort: No respiratory distress.      Breath sounds: No wheezing.   Abdominal:      General: Abdomen is flat. There is no distension.      Palpations: There is no mass.      Tenderness: There is no abdominal tenderness. There is no guarding or rebound.      Hernia: No hernia is present.   Musculoskeletal:         General: No swelling, tenderness, deformity or signs of injury.   Skin:     Coloration: Skin is not jaundiced or pale.      Findings: No bruising or erythema.   Neurological:      Mental Status: She is alert. She is disoriented.      Cranial Nerves: No cranial nerve deficit.      Sensory: No sensory deficit.      Motor: No weakness.      Coordination: Coordination normal.      Gait: Gait normal.      Deep Tendon Reflexes: Reflexes normal.   Psychiatric:       Comments: And oriented x1         Discharge Date  11/1/2021    FOLLOW UP ITEMS POST DISCHARGE  Follow-up with a primary care physician    DISCHARGE DIAGNOSES  Active Problems:    Sepsis (HCC) POA: Yes    Hyperglycemia POA: Yes    Elevated lactic acid level POA: Yes    N&V (nausea and vomiting) POA: Yes    Cholecystitis POA: Yes    Altered mental state POA: No  Resolved Problems:    * No resolved hospital problems. *      FOLLOW UP  No future appointments.  Jori Wills M.D.  [address] [facility]  [place] [address]  [phone]    Schedule an appointment as soon as possible for a visit in 2 weeks  For wound re-check      MEDICATIONS ON DISCHARGE     Medication List      You have not been prescribed any medications.         Allergies  No Known Allergies    DIET  Orders Placed This Encounter   Procedures   • Diet Order Diet: Clear Liquid     Standing Status:   Standing     Number of Occurrences:   1     Order Specific Question:   Diet:     Answer:   Clear Liquid [10]       ACTIVITY  As tolerated.  Weight bearing as tolerated    CONSULTATIONS  General surgery    PROCEDURES  Laparoscopic cholecystectomy 10/31/2020

## 2021-11-01 NOTE — PROGRESS NOTES
Patient being discharged. Patient educated on discharge instructions and new prescriptions. Patient verbalized understanding. Follow up appt made with PCP PIV removed, telemetry monitor checked in. Patient going home with june. RN to call transport to escort out. Will monitor until patient is off unit.

## 2021-11-03 LAB — LACTOFERRIN STL QL IA: NEGATIVE

## 2021-11-14 ENCOUNTER — HOSPITAL ENCOUNTER (EMERGENCY)
Facility: MEDICAL CENTER | Age: 66
End: 2021-11-15
Attending: EMERGENCY MEDICINE
Payer: MEDICARE

## 2021-11-14 DIAGNOSIS — R41.3 MEMORY CHANGES: ICD-10-CM

## 2021-11-14 DIAGNOSIS — Z04.9 OBSERVATION AND EVALUATION FOR SUSPECTED CONDITIONS NOT FOUND: ICD-10-CM

## 2021-11-14 LAB
ALBUMIN SERPL BCP-MCNC: 3.9 G/DL (ref 3.2–4.9)
ALBUMIN/GLOB SERPL: 1.4 G/DL
ALP SERPL-CCNC: 86 U/L (ref 30–99)
ALT SERPL-CCNC: 14 U/L (ref 2–50)
ANION GAP SERPL CALC-SCNC: 9 MMOL/L (ref 7–16)
APPEARANCE UR: CLEAR
AST SERPL-CCNC: 21 U/L (ref 12–45)
BACTERIA #/AREA URNS HPF: NEGATIVE /HPF
BASOPHILS # BLD AUTO: 1.1 % (ref 0–1.8)
BASOPHILS # BLD: 0.08 K/UL (ref 0–0.12)
BILIRUB SERPL-MCNC: 0.2 MG/DL (ref 0.1–1.5)
BILIRUB UR QL STRIP.AUTO: NEGATIVE
BUN SERPL-MCNC: 11 MG/DL (ref 8–22)
CALCIUM SERPL-MCNC: 9.1 MG/DL (ref 8.5–10.5)
CAOX CRY #/AREA URNS HPF: NORMAL /HPF
CHLORIDE SERPL-SCNC: 103 MMOL/L (ref 96–112)
CO2 SERPL-SCNC: 27 MMOL/L (ref 20–33)
COLOR UR: YELLOW
CREAT SERPL-MCNC: 0.5 MG/DL (ref 0.5–1.4)
EOSINOPHIL # BLD AUTO: 0.25 K/UL (ref 0–0.51)
EOSINOPHIL NFR BLD: 3.5 % (ref 0–6.9)
EPI CELLS #/AREA URNS HPF: NEGATIVE /HPF
ERYTHROCYTE [DISTWIDTH] IN BLOOD BY AUTOMATED COUNT: 47.8 FL (ref 35.9–50)
GLOBULIN SER CALC-MCNC: 2.7 G/DL (ref 1.9–3.5)
GLUCOSE SERPL-MCNC: 105 MG/DL (ref 65–99)
GLUCOSE UR STRIP.AUTO-MCNC: NEGATIVE MG/DL
HCT VFR BLD AUTO: 37.2 % (ref 37–47)
HGB BLD-MCNC: 12 G/DL (ref 12–16)
IMM GRANULOCYTES # BLD AUTO: 0.01 K/UL (ref 0–0.11)
IMM GRANULOCYTES NFR BLD AUTO: 0.1 % (ref 0–0.9)
KETONES UR STRIP.AUTO-MCNC: NEGATIVE MG/DL
LACTATE BLD-SCNC: 1.2 MMOL/L (ref 0.5–2)
LEUKOCYTE ESTERASE UR QL STRIP.AUTO: NEGATIVE
LIPASE SERPL-CCNC: 49 U/L (ref 11–82)
LYMPHOCYTES # BLD AUTO: 2.75 K/UL (ref 1–4.8)
LYMPHOCYTES NFR BLD: 38.7 % (ref 22–41)
MCH RBC QN AUTO: 29.6 PG (ref 27–33)
MCHC RBC AUTO-ENTMCNC: 32.3 G/DL (ref 33.6–35)
MCV RBC AUTO: 91.9 FL (ref 81.4–97.8)
MICRO URNS: ABNORMAL
MONOCYTES # BLD AUTO: 0.48 K/UL (ref 0–0.85)
MONOCYTES NFR BLD AUTO: 6.8 % (ref 0–13.4)
NEUTROPHILS # BLD AUTO: 3.53 K/UL (ref 2–7.15)
NEUTROPHILS NFR BLD: 49.8 % (ref 44–72)
NITRITE UR QL STRIP.AUTO: NEGATIVE
NRBC # BLD AUTO: 0 K/UL
NRBC BLD-RTO: 0 /100 WBC
PH UR STRIP.AUTO: 6 [PH] (ref 5–8)
PLATELET # BLD AUTO: 328 K/UL (ref 164–446)
PMV BLD AUTO: 9 FL (ref 9–12.9)
POTASSIUM SERPL-SCNC: 3.8 MMOL/L (ref 3.6–5.5)
PROT SERPL-MCNC: 6.6 G/DL (ref 6–8.2)
PROT UR QL STRIP: NEGATIVE MG/DL
RBC # BLD AUTO: 4.05 M/UL (ref 4.2–5.4)
RBC # URNS HPF: NORMAL /HPF
RBC UR QL AUTO: ABNORMAL
SODIUM SERPL-SCNC: 139 MMOL/L (ref 135–145)
SP GR UR STRIP.AUTO: 1.02
UROBILINOGEN UR STRIP.AUTO-MCNC: 0.2 MG/DL
WBC # BLD AUTO: 7.1 K/UL (ref 4.8–10.8)
WBC #/AREA URNS HPF: NORMAL /HPF

## 2021-11-14 PROCEDURE — 80053 COMPREHEN METABOLIC PANEL: CPT

## 2021-11-14 PROCEDURE — 85025 COMPLETE CBC W/AUTO DIFF WBC: CPT

## 2021-11-14 PROCEDURE — 81001 URINALYSIS AUTO W/SCOPE: CPT

## 2021-11-14 PROCEDURE — 83605 ASSAY OF LACTIC ACID: CPT

## 2021-11-14 PROCEDURE — 83690 ASSAY OF LIPASE: CPT

## 2021-11-14 PROCEDURE — 99284 EMERGENCY DEPT VISIT MOD MDM: CPT

## 2021-11-14 ASSESSMENT — PAIN DESCRIPTION - PAIN TYPE: TYPE: ACUTE PAIN

## 2021-11-14 ASSESSMENT — LIFESTYLE VARIABLES
HAVE PEOPLE ANNOYED YOU BY CRITICIZING YOUR DRINKING: NO
CONSUMPTION TOTAL: INCOMPLETE
TOTAL SCORE: 0
EVER FELT BAD OR GUILTY ABOUT YOUR DRINKING: NO
TOTAL SCORE: 0
TOTAL SCORE: 0
HAVE YOU EVER FELT YOU SHOULD CUT DOWN ON YOUR DRINKING: NO
EVER HAD A DRINK FIRST THING IN THE MORNING TO STEADY YOUR NERVES TO GET RID OF A HANGOVER: NO
DO YOU DRINK ALCOHOL: NO

## 2021-11-14 ASSESSMENT — FIBROSIS 4 INDEX: FIB4 SCORE: 1.06

## 2021-11-15 VITALS
BODY MASS INDEX: 26.38 KG/M2 | RESPIRATION RATE: 18 BRPM | HEART RATE: 56 BPM | WEIGHT: 125.66 LBS | OXYGEN SATURATION: 100 % | SYSTOLIC BLOOD PRESSURE: 165 MMHG | TEMPERATURE: 98.4 F | HEIGHT: 58 IN | DIASTOLIC BLOOD PRESSURE: 78 MMHG

## 2021-11-15 NOTE — FACE TO FACE
Face to Face Supporting Documentation - Home Health    The encounter with this patient was in whole or in part the primary reason for home health admission.    Date of encounter:   Patient:                    MRN:                       YOB: 2023  Dottie Enamorado  [id number]  1955     Home health to see patient for:  Skilled Nursing care for assessment, interventions & education, Home health aide and Physical Therapy evaluation and treatment    Skilled need for:  Recent Deterioration of Health Status With frequent memory issues and difficulty attending to ADLs      Homebound status evidenced by:  Need the aid of supportive devices such as crutches, canes, wheelchairs or walkers or Needs the assistance of another person in order to leave the home. Leaving home requires a considerable and taxing effort. There is a normal inability to leave the home.    Community Physician to provide follow up care: Pcp Pt States None     Optional Interventions? No      I certify the face to face encounter for this home health care referral meets the CMS requirements and the encounter/clinical assessment with the patient was, in whole, or in part, for the medical condition(s) listed above, which is the primary reason for home health care. Based on my clinical findings: the service(s) are medically necessary, support the need for home health care, and the homebound criteria are met.  I certify that this patient has had a face to face encounter by myself.  Rogelio De Guzman M.D. - NPI: [id number]

## 2021-11-15 NOTE — ED TRIAGE NOTES
"Chief Complaint   Patient presents with   • Abdominal Pain     Gallbladder removal surgery two weeks ago and was treated for UTI. Abdominal pain started about a half hour ago.        Pt ambulated to triage for above complaint.    Pt describes pain as \"twisting and squeezing\" Pt denies N/V/D but reports she has been constipated with her last BM two days ago.  Pt is AO x 4, follows commands, and responds appropriately to questions. Patient's breathing is unlabored and pain is currently 9/10 on the 0-10 pain scale.  Pt placed in lobby. Patient educated on triage process and encouraged to alert staff for any changes.    /72   Pulse 68   Temp 35.8 °C (96.5 °F) (Temporal)   Resp 18   Ht 1.473 m (4' 10\")   Wt 57 kg (125 lb 10.6 oz)   SpO2 97%     "

## 2021-11-15 NOTE — ED PROVIDER NOTES
"ED Provider Note    CHIEF COMPLAINT  Chief Complaint   Patient presents with   • Abdominal Pain     Gallbladder removal surgery two weeks ago and was treated for UTI. Abdominal pain started about a half hour ago.        HPI  Dottie Enamorado is a 66 y.o. female who presents for evaluation of abdominal pain.  Patient is postop day 14 after cholecystectomy and was reportedly recovering well although she has been having some memory issues for about a year which seem to have worsened after the surgery.  Earlier today she started having lower abdominal cramping and difficulty starting a urine stream.  She also notes urinary frequency but no burning or blood.    REVIEW OF SYSTEMS  Constitutional: No fevers or chills  Skin: No rashes  HEENT: No sore throat, runny nose  Neck: No neck pain  Chest: No pain   Pulm: No shortness of breath, cough, wheezing, stridor, or pain with inspiration/expiration  Gastrointestinal: No nausea, vomiting.  \"A tiny amount of diarrhea\" and lower abdominal cramping noted  Genitourinary: No dysuria or hematuria.  Urinary frequency and hesitation  Musculoskeletal: No recent trauma, pain, swelling, weakness  Neurologic: No sensory or motor changes. No confusion or disorientation.  Heme: No bleeding or bruising problems.   Immuno: No hx of recurrent infections    PAST FAM HISTORY  History reviewed. No pertinent family history.    PAST MEDICAL HISTORY       SOCIAL HISTORY  Social History     Tobacco Use   • Smoking status: Former Smoker   • Smokeless tobacco: Never Used   Vaping Use   • Vaping Use: Never used   Substance and Sexual Activity   • Alcohol use: Yes     Comment: \"occasionally\"    • Drug use: Not Currently   • Sexual activity: Not on file       SURGICAL HISTORY   has a past surgical history that includes cholecystectomy robotic xi (10/31/2021).    CURRENT MEDICATIONS  Home Medications     Reviewed by Dannielle Weiner R.N. (Registered Nurse) on 11/14/21 at 6659  Med List Status: Partial " "  Medication Last Dose Status        Patient Thompson Taking any Medications                       ALLERGIES  No Known Allergies    PHYSICAL EXAM  VITAL SIGNS: BP (!) 165/78   Pulse (!) 56   Temp 36.9 °C (98.4 °F)   Resp 18   Ht 1.473 m (4' 10\")   Wt 57 kg (125 lb 10.6 oz)   SpO2 100%   BMI 26.26 kg/m²    Gen: Alert in no apparent distress.  HEENT: No signs of trauma, Bilateral external ears normal, Nose normal. Conjunctiva normal, Non-icteric.   Neck:  No tenderness, Supple, No masses  Lymphatic: No cervical lymphadenopathy noted.   Cardiovascular: Regular rate and rhythm, no murmurs.  Capillary refill less than 3 seconds to all extremities, 2+ distal pulses.  Thorax & Lungs: Normal breath sounds, No respiratory distress, No wheezing bilateral chest rise  Abdomen: Bowel sounds normal, Soft, somewhat diffuse tenderness and patient tends to jump initially but there is no guarding or rigidity otherwise.    Skin: Warm, Dry, No erythema, No rash noted to exposed areas.   Extremities: Intact distal pulses, No edema  Neurologic: Alert , no facial droop, grossly normal coordination and strength  Psychiatric: Affect pleasant      LABS  Results for orders placed or performed during the hospital encounter of 11/14/21   CBC WITH DIFFERENTIAL   Result Value Ref Range    WBC 7.1 4.8 - 10.8 K/uL    RBC 4.05 (L) 4.20 - 5.40 M/uL    Hemoglobin 12.0 12.0 - 16.0 g/dL    Hematocrit 37.2 37.0 - 47.0 %    MCV 91.9 81.4 - 97.8 fL    MCH 29.6 27.0 - 33.0 pg    MCHC 32.3 (L) 33.6 - 35.0 g/dL    RDW 47.8 35.9 - 50.0 fL    Platelet Count 328 164 - 446 K/uL    MPV 9.0 9.0 - 12.9 fL    Neutrophils-Polys 49.80 44.00 - 72.00 %    Lymphocytes 38.70 22.00 - 41.00 %    Monocytes 6.80 0.00 - 13.40 %    Eosinophils 3.50 0.00 - 6.90 %    Basophils 1.10 0.00 - 1.80 %    Immature Granulocytes 0.10 0.00 - 0.90 %    Nucleated RBC 0.00 /100 WBC    Neutrophils (Absolute) 3.53 2.00 - 7.15 K/uL    Lymphs (Absolute) 2.75 1.00 - 4.80 K/uL    Monos " (Absolute) 0.48 0.00 - 0.85 K/uL    Eos (Absolute) 0.25 0.00 - 0.51 K/uL    Baso (Absolute) 0.08 0.00 - 0.12 K/uL    Immature Granulocytes (abs) 0.01 0.00 - 0.11 K/uL    NRBC (Absolute) 0.00 K/uL   COMP METABOLIC PANEL   Result Value Ref Range    Sodium 139 135 - 145 mmol/L    Potassium 3.8 3.6 - 5.5 mmol/L    Chloride 103 96 - 112 mmol/L    Co2 27 20 - 33 mmol/L    Anion Gap 9.0 7.0 - 16.0    Glucose 105 (H) 65 - 99 mg/dL    Bun 11 8 - 22 mg/dL    Creatinine 0.50 0.50 - 1.40 mg/dL    Calcium 9.1 8.5 - 10.5 mg/dL    AST(SGOT) 21 12 - 45 U/L    ALT(SGPT) 14 2 - 50 U/L    Alkaline Phosphatase 86 30 - 99 U/L    Total Bilirubin 0.2 0.1 - 1.5 mg/dL    Albumin 3.9 3.2 - 4.9 g/dL    Total Protein 6.6 6.0 - 8.2 g/dL    Globulin 2.7 1.9 - 3.5 g/dL    A-G Ratio 1.4 g/dL   LIPASE   Result Value Ref Range    Lipase 49 11 - 82 U/L   URINALYSIS    Specimen: Blood   Result Value Ref Range    Color Yellow     Character Clear     Specific Gravity 1.025 <1.035    Ph 6.0 5.0 - 8.0    Glucose Negative Negative mg/dL    Ketones Negative Negative mg/dL    Protein Negative Negative mg/dL    Bilirubin Negative Negative    Urobilinogen, Urine 0.2 Negative    Nitrite Negative Negative    Leukocyte Esterase Negative Negative    Occult Blood Small (A) Negative    Micro Urine Req Microscopic    LACTIC ACID   Result Value Ref Range    Lactic Acid 1.2 0.5 - 2.0 mmol/L   ESTIMATED GFR   Result Value Ref Range    GFR If African American >60 >60 mL/min/1.73 m 2    GFR If Non African American >60 >60 mL/min/1.73 m 2   URINE MICROSCOPIC (W/UA)   Result Value Ref Range    WBC Rare /hpf    RBC Rare /hpf    Bacteria Negative None /hpf    Epithelial Cells Negative /hpf    Ca Oxalate Crystal Few /hpf       COURSE & MEDICAL DECISION MAKING  Patient arrives for evaluation of what appears to be a urinary issue in the postsurgical setting.  Patient noted that she was recovering well after the initial cholecystectomy but feels like her symptoms are similar to  "when she was diagnosed with urinary tract infection and sepsis which initiated her hospitalization prior to the cholecystectomy 2 weeks ago.  Based on this we will reevaluate her labs and urinalysis but I feel imaging can be safely deferred until the labs have returned.    11:51 PM  Reevaluated patient at bedside, she is calm, nondistressed, and attentive.  Her niece states that she thinks that dementia is playing a role in her presentation today and that the patient sometimes \"fibs\" because she does not understand her symptoms and ends up in the emergency department getting evaluated.  She is, however, comforted by the fact that there do not appear to be any emergent issues and, given her lack of abdominal pain or tenderness, I do not feel CT imaging of her abdomen is necessary.  I do not feel this is a postsurgical issue however I do agree with the patient's family member that she needs more help at home as it seems that there may be some dementia in the background which is slowly getting worse over the course of years.  I will attempt to put in PT OT as well as home health referrals.  At this point the patient does not meet any criteria for admission and her niece is comfortable taking her home with her.  They will follow-up with the primary care physician for possible referral to a specialist to help with a possible diagnosis of dementia.  I did place a face-to-face note in and a home health/PT OT consult which will hopefully go through this week.    FINAL IMPRESSION  1. Memory changes    2. Observation and evaluation for suspected conditions not found        Electronically signed by: Rogelio De Guzman M.D., 11/14/2021 9:51 PM  "
